# Patient Record
Sex: FEMALE | Race: WHITE | Employment: FULL TIME | URBAN - METROPOLITAN AREA
[De-identification: names, ages, dates, MRNs, and addresses within clinical notes are randomized per-mention and may not be internally consistent; named-entity substitution may affect disease eponyms.]

---

## 2017-06-05 ENCOUNTER — HOSPITAL ENCOUNTER (OUTPATIENT)
Dept: LAB | Age: 47
Discharge: HOME | End: 2017-06-05

## 2017-06-05 PROCEDURE — 88305 TISSUE EXAM BY PATHOLOGIST: CPT | Performed by: INTERNAL MEDICINE

## 2018-02-12 ENCOUNTER — HOSPITAL ENCOUNTER (OUTPATIENT)
Dept: MAMMOGRAPHY | Age: 48
Discharge: HOME | End: 2018-02-12
Attending: FAMILY MEDICINE
Payer: COMMERCIAL

## 2018-02-12 DIAGNOSIS — Z12.31 VISIT FOR SCREENING MAMMOGRAM: ICD-10-CM

## 2018-02-12 PROCEDURE — 77067 SCR MAMMO BI INCL CAD: CPT

## 2018-10-27 ENCOUNTER — HOSPITAL ENCOUNTER (OUTPATIENT)
Dept: MRI IMAGING | Age: 48
Discharge: HOME | End: 2018-10-27
Attending: FAMILY MEDICINE
Payer: COMMERCIAL

## 2018-10-27 DIAGNOSIS — G50.0 TRIGEMINAL NEURALGIA OF RIGHT SIDE OF FACE: ICD-10-CM

## 2018-10-27 PROCEDURE — 74011250636 HC RX REV CODE- 250/636

## 2018-10-27 PROCEDURE — 70553 MRI BRAIN STEM W/O & W/DYE: CPT

## 2018-10-27 PROCEDURE — A9575 INJ GADOTERATE MEGLUMI 0.1ML: HCPCS

## 2018-10-27 RX ORDER — SODIUM CHLORIDE 0.9 % (FLUSH) 0.9 %
10 SYRINGE (ML) INJECTION
Status: COMPLETED | OUTPATIENT
Start: 2018-10-27 | End: 2018-10-27

## 2018-10-27 RX ORDER — GADOTERATE MEGLUMINE 376.9 MG/ML
16 INJECTION INTRAVENOUS
Status: COMPLETED | OUTPATIENT
Start: 2018-10-27 | End: 2018-10-27

## 2018-10-27 RX ADMIN — GADOTERATE MEGLUMINE 16 ML: 376.9 INJECTION INTRAVENOUS at 10:57

## 2018-10-27 RX ADMIN — Medication 10 ML: at 10:57

## 2018-11-07 PROBLEM — G43.009 MIGRAINE WITHOUT AURA AND WITHOUT STATUS MIGRAINOSUS, NOT INTRACTABLE: Status: ACTIVE | Noted: 2018-11-07

## 2018-11-07 PROBLEM — T50.905D MEDICATION SIDE EFFECT, SUBSEQUENT ENCOUNTER: Status: ACTIVE | Noted: 2018-11-07

## 2018-11-07 PROBLEM — H57.02 PHYSIOLOGIC ANISOCORIA: Status: ACTIVE | Noted: 2018-11-07

## 2018-11-07 PROBLEM — G50.0 TRIGEMINAL NEURALGIA OF RIGHT SIDE OF FACE: Status: ACTIVE | Noted: 2018-11-07

## 2019-01-05 ENCOUNTER — HOSPITAL ENCOUNTER (EMERGENCY)
Age: 49
Discharge: HOME | End: 2019-01-05
Attending: EMERGENCY MEDICINE
Payer: COMMERCIAL

## 2019-01-05 VITALS
TEMPERATURE: 98 F | SYSTOLIC BLOOD PRESSURE: 162 MMHG | DIASTOLIC BLOOD PRESSURE: 87 MMHG | RESPIRATION RATE: 16 BRPM | OXYGEN SATURATION: 99 % | WEIGHT: 180 LBS | BODY MASS INDEX: 28.25 KG/M2 | HEIGHT: 67 IN | HEART RATE: 76 BPM

## 2019-01-05 DIAGNOSIS — G50.0 TRIGEMINAL NEURALGIA: Primary | ICD-10-CM

## 2019-01-05 PROCEDURE — 74011000250 HC RX REV CODE- 250: Performed by: NURSE PRACTITIONER

## 2019-01-05 PROCEDURE — 74011250636 HC RX REV CODE- 250/636: Performed by: NURSE PRACTITIONER

## 2019-01-05 PROCEDURE — 99284 EMERGENCY DEPT VISIT MOD MDM: CPT | Performed by: EMERGENCY MEDICINE

## 2019-01-05 PROCEDURE — 96372 THER/PROPH/DIAG INJ SC/IM: CPT | Performed by: EMERGENCY MEDICINE

## 2019-01-05 RX ORDER — ACETAMINOPHEN AND CODEINE PHOSPHATE 300; 30 MG/1; MG/1
1 TABLET ORAL
Qty: 20 TAB | Refills: 0 | Status: SHIPPED | OUTPATIENT
Start: 2019-01-05

## 2019-01-05 RX ORDER — LIDOCAINE HYDROCHLORIDE 20 MG/ML
15 SOLUTION OROPHARYNGEAL AS NEEDED
Qty: 1 BOTTLE | Refills: 0 | Status: SHIPPED | OUTPATIENT
Start: 2019-01-05

## 2019-01-05 RX ORDER — LIDOCAINE HYDROCHLORIDE 20 MG/ML
15 SOLUTION OROPHARYNGEAL
Status: COMPLETED | OUTPATIENT
Start: 2019-01-05 | End: 2019-01-05

## 2019-01-05 RX ORDER — MORPHINE SULFATE 2 MG/ML
2 INJECTION, SOLUTION INTRAMUSCULAR; INTRAVENOUS
Status: COMPLETED | OUTPATIENT
Start: 2019-01-05 | End: 2019-01-05

## 2019-01-05 RX ADMIN — LIDOCAINE HYDROCHLORIDE 15 ML: 20 SOLUTION ORAL; TOPICAL at 15:34

## 2019-01-05 RX ADMIN — MORPHINE SULFATE 2 MG: 2 INJECTION, SOLUTION INTRAMUSCULAR; INTRAVENOUS at 15:30

## 2019-01-05 NOTE — ED NOTES
I have reviewed discharge instructions with the patient. The patient verbalized understanding. Patient left ED via Discharge Method: ambulatory to Home   Opportunity for questions and clarification provided. Patient given 2 scripts. To continue your aftercare when you leave the hospital, you may receive an automated call from our care team to check in on how you are doing. This is a free service and part of our promise to provide the best care and service to meet your aftercare needs.  If you have questions, or wish to unsubscribe from this service please call [phone]. Thank you for Choosing our [facility] Emergency Department.

## 2019-01-05 NOTE — DISCHARGE INSTRUCTIONS
Patient Education   home with family    Take the baclofen twice daily, and restart your neurontin/gabapentin 100 mg four times daily. Continue your trileptal as well. Use the viscous lidocaine on the inside of your cheek every 3 hours as needed as well. Contact Dr Adriana Watkins on Monday for the Pain Team.  Work note     Trigeminal Neuralgia: Care Instructions  Your Care Instructions    Trigeminal neuralgia is a problem with the large nerve that brings feeling to your face. It causes a sudden, sharp pain on one side of your face. Just touching your cheek or talking can set off shooting pain toward the ear, eye, or nostril. Living with this pain can be very hard. Some people have long periods when they do not have pain, and then it comes back. Some people have periods of pain often. But medicine or other treatment often can make the pain go away. If you keep having pain, surgery may help. This problem is also called tic douloureux (say \"tik doo-smith-CARY\"). Follow-up care is a key part of your treatment and safety. Be sure to make and go to all appointments, and call your doctor if you are having problems. It's also a good idea to know your test results and keep a list of the medicines you take. How can you care for yourself at home? · Write down when you have pain and what you were doing when it started. Try to find what causes the pain. Being in a cold wind, yawning, or shaving are examples. Avoid or limit these triggers if you can. · Be safe with medicines. Take your medicines exactly as prescribed. ? Your doctor may have prescribed medicines used to treat depression and seizures. They can reduce your pain, help you sleep better, and improve your mood. ? Call your doctor if you think you are having a problem with your medicine. You will get more details on the specific medicines your doctor prescribes.   ? If you are not taking a prescription pain medicine, take an over-the-counter medicine such as acetaminophen (Tylenol), ibuprofen (Advil, Motrin), or naproxen (Aleve). Read and follow all instructions on the label. ? Do not take two or more pain medicines at the same time unless the doctor told you to. Many pain medicines have acetaminophen, which is Tylenol. Too much acetaminophen (Tylenol) can be harmful. · Reduce stress in your life. Ask your doctor about ways to relax. These may include breathing exercises and massage. · Think about joining a support group with other people who have this problem. These groups can give comfort and information about what to do to feel better. Your doctor can tell you how to find a support group. When should you call for help? Call your doctor now or seek immediate medical care if:    · You have severe pain that you can't control.    Watch closely for changes in your health, and be sure to contact your doctor if:    · You are not able to sleep because of the pain.     · You do not get better as expected. Where can you learn more? Go to http://savanna-tye.info/. Enter R378 in the search box to learn more about \"Trigeminal Neuralgia: Care Instructions. \"  Current as of: November 20, 2017  Content Version: 11.8  © 6638-9261 [employer]. Care instructions adapted under license by [employer] (which disclaims liability or warranty for this information). If you have questions about a medical condition or this instruction, always ask your healthcare professional. [place] [address] any warranty or liability for your use of this information.

## 2019-01-05 NOTE — LETTER
[facility] EMERGENCY DEPT  [place] [id number] [phone]    Work/School Note    Date: 1/5/2019    To Whom It May concern:    Tab Arce was seen and treated today in the emergency room by the following provider(s):  Attending Provider: Divya Puri MD  Nurse Practitioner: Olman Galan NP. Natividad Husamjoanna Crews may return to work on Wednesday.     Sincerely,          Rehana Valera NP

## 2019-01-05 NOTE — ED TRIAGE NOTES
Pt states she was given baclofen from [address] [place] [address] today and has taken one but did not help with the pain at all.

## 2019-01-05 NOTE — ED TRIAGE NOTES
Pt states hx of trigeminal neuralgia and having severe pain on right side of mouth. States medication is not helping the pain this week but much worse last night.

## 2019-01-05 NOTE — ED PROVIDER NOTES
51 y/o f to ed with severe right side facial pain, cheek most severe. Has been diagnosed by MRI with trigeminal neuralgia, has been on trileptal since October with increasing pain in right face since that time. Has seen neurology Dr Romero and he added neurontin 100 mg qid. Over the last few weeks pain has increased dramatically but severely in last week. Primary md has increased trileptal from 600 mg to 900 to 1200 mg bid. Pt was seen at urgent care earlier today and started on baclofen as well but pain severe. She has come to ed for pain releif and asking for neurology to be contacted. She states she cant eat, drink, talk and having severe pain any movement of her mouth or face.    at side              Past Medical History:   Diagnosis Date    Allergic rhinitis 2013    Chronic LBP     and leg pain secondary nerve damage    DDD (degenerative disc disease), lumbar     EVI (generalized anxiety disorder) 2013    GERD (gastroesophageal reflux disease) 2013    HTN (hypertension), benign 2013    Kidney stone 2014    Migraine headache 2013    TABITHA (obstructive sleep apnea) 2008    not using CPAP now       Past Surgical History:   Procedure Laterality Date    HX  SECTION      HX HYSTERECTOMY  1998    bleeding    HX LAP CHOLECYSTECTOMY  10/2014    HX LUMBAR DISKECTOMY      HX LUMBAR DISKECTOMY  2013    L5 - S1    HX OTHER SURGICAL      Urethral dilation         Family History:   Problem Relation Age of Onset    Breast Cancer Mother 36        had 2nd episode-neg BRC    Hypertension Mother    [employer] Cancer Mother     Hypertension Father     Hypertension Brother     Breast Cancer Maternal Grandmother 61    Cancer Maternal Grandmother         Pancreatic    Diabetes Maternal Grandmother     Colon Cancer Maternal Grandfather 61    Stroke Paternal Grandmother     Diabetes Paternal Grandfather        Social History     Socioeconomic History    Marital status:      Spouse name: Eduar Brown Number of children: Not on file    Years of education: Not on file    Highest education level: Not on file   Social Needs    Financial resource strain: Not on file    Food insecurity - worry: Not on file    Food insecurity - inability: Not on file    Transportation needs - medical: Not on file   [employer] needs - non-medical: Not on file   Occupational History    Occupation: Teacher    Tobacco Use    Smoking status: Never Smoker    Smokeless tobacco: Never Used   Substance and Sexual Activity    Alcohol use: No    Drug use: No    Sexual activity: Not on file   Other Topics Concern    Not on file   Social History Narrative    Not on file         ALLERGIES: Flonase [fluticasone]; Keflex [cephalexin]; Penicillins; and Sulfa (sulfonamide antibiotics)    Review of Systems   Constitutional: Negative for chills and fever. HENT: Negative for ear pain and facial swelling. Eyes: Negative for discharge and redness. Respiratory: Negative for cough and shortness of breath. Cardiovascular: Negative for chest pain and palpitations. Gastrointestinal: Negative for nausea and vomiting. Genitourinary: Negative for difficulty urinating and dysuria. Musculoskeletal: Negative for back pain and neck pain. Skin: Negative for color change and wound. Neurological: Positive for headaches. Negative for weakness and light-headedness. Psychiatric/Behavioral: Negative for confusion and decreased concentration. Vitals:    01/05/19 1427   BP: 144/73   Pulse: 92   Resp: 16   Temp: 97.9 °F (36.6 °C)   SpO2: 100%   Weight: 81.6 kg (180 lb)   Height: 5' 7\" (1.702 m)            Physical Exam   Constitutional: She is oriented to person, place, and time. She appears well-developed and well-nourished. She appears distressed (crying with pain). HENT:   Head: Normocephalic and atraumatic. Eyes: EOM are normal. Pupils are equal, round, and reactive to light. Neck: Normal range of motion. Cardiovascular: Normal rate and regular rhythm. Pulmonary/Chest: Effort normal and breath sounds normal.   Musculoskeletal: Normal range of motion. Neurological: She is alert and oriented to person, place, and time. Skin: Skin is warm and dry. Psychiatric: She has a normal mood and affect. Her behavior is normal. Thought content normal.   Nursing note and vitals reviewed. MDM  Number of Diagnoses or Management Options  Diagnosis management comments: 49 y/o f to ed with severe right side facial pain, cheek most severe. Has been diagnosed by MRI with trigeminal neuralgia, has been on trileptal since October with increasing pain in right face since that time. Has seen neurology Dr Romero and he added neurontin 100 mg qid. Over the last few weeks pain has increased dramatically but severely in last week. Primary md has increased trileptal from 600 mg to 900 to 1200 mg bid. Pt was seen at urgent care earlier today and started on baclofen as well but pain severe. She has come to ed for pain releif and asking for neurology to be contacted. She states she cant eat, drink, talk and having severe pain any movement of her mouth or face.  at side   3:51 PM  Will provide im morphine and buccal lidocaine and discuss with neurologist on call.    4:09 PM\  D/w Dr Romero - asked me to confirm if pt still on neurontin and if not restart at same dose, find out dose and frequency of baclofen and continue with neurontin at 10 mg bid. And pt to see pain team with Dr Shemar Sandoval on Monday. I have discussed with pt who is feeling better now and she stopped neurontin some time ago. But she still has med bottle with refills on bottle. As well, baclofen was written for 10 mg tid - but I have discussed to take twice daily.    Will dc home       Amount and/or Complexity of Data Reviewed  Discuss the patient with other providers: yes Serjio Vick - neuro)    Risk of Complications, Morbidity, and/or Mortality  Presenting problems: minimal  Diagnostic procedures: minimal  Management options: low    Patient Progress  Patient progress: stable         Procedures

## 2019-01-12 ENCOUNTER — HOSPITAL ENCOUNTER (INPATIENT)
Age: 49
LOS: 3 days | Discharge: HOME | DRG: 683 | End: 2019-01-15
Attending: EMERGENCY MEDICINE | Admitting: HOSPITALIST
Payer: COMMERCIAL

## 2019-01-12 DIAGNOSIS — N17.9 ACUTE KIDNEY INJURY (HCC): ICD-10-CM

## 2019-01-12 DIAGNOSIS — R19.7 ACUTE DIARRHEA: ICD-10-CM

## 2019-01-12 DIAGNOSIS — I95.9 HYPOTENSION, UNSPECIFIED HYPOTENSION TYPE: ICD-10-CM

## 2019-01-12 DIAGNOSIS — R19.7 DIARRHEA, UNSPECIFIED TYPE: ICD-10-CM

## 2019-01-12 DIAGNOSIS — A41.9 SEVERE SEPSIS (HCC): ICD-10-CM

## 2019-01-12 DIAGNOSIS — R65.20 SEVERE SEPSIS (HCC): ICD-10-CM

## 2019-01-12 DIAGNOSIS — E87.1 ACUTE HYPONATREMIA: Primary | ICD-10-CM

## 2019-01-12 PROBLEM — N39.0 ACUTE UTI (URINARY TRACT INFECTION): Status: ACTIVE | Noted: 2019-01-12

## 2019-01-12 PROBLEM — D72.829 LEUKOCYTOSIS: Status: ACTIVE | Noted: 2019-01-12

## 2019-01-12 PROBLEM — E87.20 LACTIC ACIDOSIS: Status: ACTIVE | Noted: 2019-01-12

## 2019-01-12 LAB
ALBUMIN SERPL-MCNC: 3.9 G/DL (ref 3.5–5)
ALBUMIN/GLOB SERPL: 1 {RATIO}
ALP SERPL-CCNC: 200 U/L (ref 50–130)
ALT SERPL-CCNC: 93 U/L (ref 12–65)
ANION GAP SERPL CALC-SCNC: 13 MMOL/L
APPEARANCE UR: ABNORMAL
AST SERPL-CCNC: 73 U/L (ref 15–37)
BACTERIA URNS QL MICRO: ABNORMAL /HPF
BASOPHILS # BLD: 0.1 K/UL (ref 0–0.2)
BASOPHILS NFR BLD: 0 % (ref 0–2)
BILIRUB SERPL-MCNC: 0.6 MG/DL (ref 0.2–1.1)
BILIRUB UR QL: ABNORMAL
BUN SERPL-MCNC: 17 MG/DL (ref 6–23)
CALCIUM SERPL-MCNC: 9.2 MG/DL (ref 8.3–10.4)
CASTS URNS QL MICRO: ABNORMAL /LPF
CHLORIDE SERPL-SCNC: 76 MMOL/L (ref 98–107)
CO2 SERPL-SCNC: 24 MMOL/L (ref 21–32)
COLOR UR: ABNORMAL
CREAT SERPL-MCNC: 1.96 MG/DL (ref 0.6–1)
DIFFERENTIAL METHOD BLD: ABNORMAL
EOSINOPHIL # BLD: 0 K/UL (ref 0–0.8)
EOSINOPHIL NFR BLD: 0 % (ref 0.5–7.8)
EPI CELLS #/AREA URNS HPF: ABNORMAL /HPF
ERYTHROCYTE [DISTWIDTH] IN BLOOD BY AUTOMATED COUNT: 12.3 % (ref 11.9–14.6)
GLOBULIN SER CALC-MCNC: 4.1 G/DL (ref 2.3–3.5)
GLUCOSE SERPL-MCNC: 186 MG/DL (ref 65–100)
GLUCOSE UR STRIP.AUTO-MCNC: NEGATIVE MG/DL
HCT VFR BLD AUTO: 44.3 % (ref 35.8–46.3)
HGB BLD-MCNC: 15.7 G/DL (ref 11.7–15.4)
HGB UR QL STRIP: NEGATIVE
IMM GRANULOCYTES # BLD AUTO: 0.1 K/UL (ref 0–0.5)
IMM GRANULOCYTES NFR BLD AUTO: 0 % (ref 0–5)
KETONES UR QL STRIP.AUTO: ABNORMAL MG/DL
LACTATE BLD-SCNC: 4.07 MMOL/L (ref 0.5–1.9)
LACTATE BLD-SCNC: 5.63 MMOL/L (ref 0.5–1.9)
LEUKOCYTE ESTERASE UR QL STRIP.AUTO: ABNORMAL
LYMPHOCYTES # BLD: 0.7 K/UL (ref 0.5–4.6)
LYMPHOCYTES NFR BLD: 4 % (ref 13–44)
MCH RBC QN AUTO: 29.5 PG (ref 26.1–32.9)
MCHC RBC AUTO-ENTMCNC: 35.4 G/DL (ref 31.4–35)
MCV RBC AUTO: 83.3 FL (ref 79.6–97.8)
MONOCYTES # BLD: 0.7 K/UL (ref 0.1–1.3)
MONOCYTES NFR BLD: 4 % (ref 4–12)
NEUTS SEG # BLD: 14.9 K/UL (ref 1.7–8.2)
NEUTS SEG NFR BLD: 91 % (ref 43–78)
NITRITE UR QL STRIP.AUTO: NEGATIVE
NRBC # BLD: 0 K/UL (ref 0–0.2)
PH UR STRIP: 5.5 [PH] (ref 5–9)
PLATELET # BLD AUTO: 415 K/UL (ref 150–450)
PMV BLD AUTO: 8.8 FL (ref 9.4–12.3)
POTASSIUM SERPL-SCNC: 4.3 MMOL/L (ref 3.5–5.1)
PROCALCITONIN SERPL-MCNC: 3.8 NG/ML
PROT SERPL-MCNC: 8 G/DL
PROT UR STRIP-MCNC: 30 MG/DL
RBC # BLD AUTO: 5.32 M/UL (ref 4.05–5.2)
SODIUM SERPL-SCNC: 113 MMOL/L (ref 136–145)
SP GR UR REFRACTOMETRY: 1.02 (ref 1–1.02)
UROBILINOGEN UR QL STRIP.AUTO: 1 EU/DL (ref 0.2–1)
WBC # BLD AUTO: 16.4 K/UL (ref 4.3–11.1)
WBC URNS QL MICRO: ABNORMAL /HPF

## 2019-01-12 PROCEDURE — 85025 COMPLETE CBC W/AUTO DIFF WBC: CPT

## 2019-01-12 PROCEDURE — 74011250636 HC RX REV CODE- 250/636: Performed by: HOSPITALIST

## 2019-01-12 PROCEDURE — 83605 ASSAY OF LACTIC ACID: CPT

## 2019-01-12 PROCEDURE — 96365 THER/PROPH/DIAG IV INF INIT: CPT | Performed by: EMERGENCY MEDICINE

## 2019-01-12 PROCEDURE — 99285 EMERGENCY DEPT VISIT HI MDM: CPT | Performed by: EMERGENCY MEDICINE

## 2019-01-12 PROCEDURE — 80053 COMPREHEN METABOLIC PANEL: CPT

## 2019-01-12 PROCEDURE — 65660000000 HC RM CCU STEPDOWN

## 2019-01-12 PROCEDURE — 74011250637 HC RX REV CODE- 250/637: Performed by: HOSPITALIST

## 2019-01-12 PROCEDURE — 87086 URINE CULTURE/COLONY COUNT: CPT

## 2019-01-12 PROCEDURE — 87040 BLOOD CULTURE FOR BACTERIA: CPT

## 2019-01-12 PROCEDURE — 81001 URINALYSIS AUTO W/SCOPE: CPT

## 2019-01-12 PROCEDURE — 96367 TX/PROPH/DG ADDL SEQ IV INF: CPT | Performed by: EMERGENCY MEDICINE

## 2019-01-12 PROCEDURE — 84145 PROCALCITONIN (PCT): CPT

## 2019-01-12 PROCEDURE — 80183 DRUG SCRN QUANT OXCARBAZEPIN: CPT

## 2019-01-12 PROCEDURE — 93005 ELECTROCARDIOGRAM TRACING: CPT | Performed by: EMERGENCY MEDICINE

## 2019-01-12 PROCEDURE — 77030020263 HC SOL INJ SOD CL0.9% LFCR 1000ML

## 2019-01-12 PROCEDURE — 74011000258 HC RX REV CODE- 258: Performed by: EMERGENCY MEDICINE

## 2019-01-12 PROCEDURE — 77030032490 HC SLV COMPR SCD KNE COVD -B

## 2019-01-12 PROCEDURE — 74011250636 HC RX REV CODE- 250/636: Performed by: EMERGENCY MEDICINE

## 2019-01-12 RX ORDER — LIDOCAINE HYDROCHLORIDE 20 MG/ML
15 SOLUTION OROPHARYNGEAL AS NEEDED
Status: DISCONTINUED | OUTPATIENT
Start: 2019-01-12 | End: 2019-01-15 | Stop reason: HOSPADM

## 2019-01-12 RX ORDER — OXCARBAZEPINE 300 MG/1
1200 TABLET, FILM COATED ORAL EVERY 12 HOURS
Status: DISCONTINUED | OUTPATIENT
Start: 2019-01-13 | End: 2019-01-13 | Stop reason: SDUPTHER

## 2019-01-12 RX ORDER — BETAMETHASONE VALERATE 1.2 MG/G
CREAM TOPICAL
Status: DISCONTINUED | OUTPATIENT
Start: 2019-01-12 | End: 2019-01-15 | Stop reason: HOSPADM

## 2019-01-12 RX ORDER — CIPROFLOXACIN 2 MG/ML
400 INJECTION, SOLUTION INTRAVENOUS EVERY 12 HOURS
Status: DISCONTINUED | OUTPATIENT
Start: 2019-01-12 | End: 2019-01-15 | Stop reason: HOSPADM

## 2019-01-12 RX ORDER — GABAPENTIN 100 MG/1
100 CAPSULE ORAL 4 TIMES DAILY
Status: DISCONTINUED | OUTPATIENT
Start: 2019-01-12 | End: 2019-01-15 | Stop reason: HOSPADM

## 2019-01-12 RX ORDER — OXCARBAZEPINE 300 MG/1
1200 TABLET, FILM COATED ORAL EVERY 12 HOURS
Status: DISCONTINUED | OUTPATIENT
Start: 2019-01-12 | End: 2019-01-12

## 2019-01-12 RX ORDER — ACETAMINOPHEN AND CODEINE PHOSPHATE 300; 30 MG/1; MG/1
1 TABLET ORAL
Status: DISCONTINUED | OUTPATIENT
Start: 2019-01-12 | End: 2019-01-12

## 2019-01-12 RX ORDER — GABAPENTIN 100 MG/1
100 CAPSULE ORAL 4 TIMES DAILY
COMMUNITY
End: 2022-02-11

## 2019-01-12 RX ORDER — PANTOPRAZOLE SODIUM 40 MG/1
40 TABLET, DELAYED RELEASE ORAL
Status: DISCONTINUED | OUTPATIENT
Start: 2019-01-13 | End: 2019-01-15 | Stop reason: HOSPADM

## 2019-01-12 RX ORDER — DIPHENOXYLATE HYDROCHLORIDE AND ATROPINE SULFATE 2.5; .025 MG/1; MG/1
2 TABLET ORAL
Status: DISCONTINUED | OUTPATIENT
Start: 2019-01-12 | End: 2019-01-15 | Stop reason: HOSPADM

## 2019-01-12 RX ORDER — NALOXONE HYDROCHLORIDE 0.4 MG/ML
0.4 INJECTION, SOLUTION INTRAMUSCULAR; INTRAVENOUS; SUBCUTANEOUS AS NEEDED
Status: DISCONTINUED | OUTPATIENT
Start: 2019-01-12 | End: 2019-01-15 | Stop reason: HOSPADM

## 2019-01-12 RX ORDER — ENOXAPARIN SODIUM 100 MG/ML
40 INJECTION SUBCUTANEOUS EVERY 24 HOURS
Status: DISCONTINUED | OUTPATIENT
Start: 2019-01-12 | End: 2019-01-12

## 2019-01-12 RX ORDER — SODIUM CHLORIDE 9 MG/ML
100 INJECTION, SOLUTION INTRAVENOUS CONTINUOUS
Status: DISCONTINUED | OUTPATIENT
Start: 2019-01-12 | End: 2019-01-15 | Stop reason: HOSPADM

## 2019-01-12 RX ORDER — SODIUM CHLORIDE 0.9 % (FLUSH) 0.9 %
5-40 SYRINGE (ML) INJECTION AS NEEDED
Status: DISCONTINUED | OUTPATIENT
Start: 2019-01-12 | End: 2019-01-15 | Stop reason: HOSPADM

## 2019-01-12 RX ORDER — OXCARBAZEPINE 300 MG/1
1200 TABLET, FILM COATED ORAL EVERY 12 HOURS
Status: DISCONTINUED | OUTPATIENT
Start: 2019-01-13 | End: 2019-01-12

## 2019-01-12 RX ORDER — METRONIDAZOLE 500 MG/100ML
500 INJECTION, SOLUTION INTRAVENOUS EVERY 8 HOURS
Status: DISCONTINUED | OUTPATIENT
Start: 2019-01-13 | End: 2019-01-15 | Stop reason: HOSPADM

## 2019-01-12 RX ORDER — SAME BUTANEDISULFONATE/BETAINE 400-600 MG
250 POWDER IN PACKET (EA) ORAL 2 TIMES DAILY
Status: DISCONTINUED | OUTPATIENT
Start: 2019-01-13 | End: 2019-01-15 | Stop reason: HOSPADM

## 2019-01-12 RX ORDER — CLINDAMYCIN PHOSPHATE 900 MG/50ML
900 INJECTION INTRAVENOUS
Status: DISCONTINUED | OUTPATIENT
Start: 2019-01-12 | End: 2019-01-12

## 2019-01-12 RX ORDER — VANCOMYCIN/0.9 % SOD CHLORIDE 1.5G/250ML
1500 PLASTIC BAG, INJECTION (ML) INTRAVENOUS EVERY 24 HOURS
Status: DISCONTINUED | OUTPATIENT
Start: 2019-01-13 | End: 2019-01-13

## 2019-01-12 RX ORDER — ONDANSETRON 2 MG/ML
4 INJECTION INTRAMUSCULAR; INTRAVENOUS
Status: DISCONTINUED | OUTPATIENT
Start: 2019-01-12 | End: 2019-01-15 | Stop reason: HOSPADM

## 2019-01-12 RX ORDER — DICYCLOMINE HYDROCHLORIDE 20 MG/1
20 TABLET ORAL
Status: DISCONTINUED | OUTPATIENT
Start: 2019-01-12 | End: 2019-01-15 | Stop reason: HOSPADM

## 2019-01-12 RX ORDER — SODIUM CHLORIDE 0.9 % (FLUSH) 0.9 %
5-40 SYRINGE (ML) INJECTION EVERY 8 HOURS
Status: DISCONTINUED | OUTPATIENT
Start: 2019-01-12 | End: 2019-01-15 | Stop reason: HOSPADM

## 2019-01-12 RX ADMIN — VANCOMYCIN HYDROCHLORIDE 1000 MG: 1 INJECTION, POWDER, LYOPHILIZED, FOR SOLUTION INTRAVENOUS at 21:16

## 2019-01-12 RX ADMIN — AZTREONAM 2 G: 2 INJECTION, POWDER, LYOPHILIZED, FOR SOLUTION INTRAMUSCULAR; INTRAVENOUS at 20:30

## 2019-01-12 RX ADMIN — METRONIDAZOLE 500 MG: 500 INJECTION, SOLUTION INTRAVENOUS at 23:34

## 2019-01-12 RX ADMIN — SODIUM CHLORIDE 1000 ML: 900 INJECTION, SOLUTION INTRAVENOUS at 19:34

## 2019-01-12 RX ADMIN — OXCARBAZEPINE 1200 MG: 300 TABLET, FILM COATED ORAL at 23:34

## 2019-01-12 RX ADMIN — Medication 10 ML: at 23:35

## 2019-01-12 RX ADMIN — SODIUM CHLORIDE 1000 ML: 900 INJECTION, SOLUTION INTRAVENOUS at 21:34

## 2019-01-12 RX ADMIN — CIPROFLOXACIN 400 MG: 2 INJECTION, SOLUTION INTRAVENOUS at 23:34

## 2019-01-12 RX ADMIN — SODIUM CHLORIDE 1000 ML: 900 INJECTION, SOLUTION INTRAVENOUS at 22:54

## 2019-01-12 RX ADMIN — GABAPENTIN 100 MG: 100 CAPSULE ORAL at 23:31

## 2019-01-13 LAB
ALBUMIN SERPL-MCNC: 2.7 G/DL (ref 3.5–5)
ALBUMIN/GLOB SERPL: 0.9 {RATIO}
ALP SERPL-CCNC: 130 U/L (ref 50–136)
ALT SERPL-CCNC: 92 U/L (ref 12–65)
ANION GAP SERPL CALC-SCNC: 10 MMOL/L
ANION GAP SERPL CALC-SCNC: 12 MMOL/L
ANION GAP SERPL CALC-SCNC: 7 MMOL/L
ANION GAP SERPL CALC-SCNC: 7 MMOL/L
AST SERPL-CCNC: 54 U/L (ref 15–37)
ATRIAL RATE: 69 BPM
BASOPHILS # BLD: 0 K/UL (ref 0–0.2)
BASOPHILS NFR BLD: 0 % (ref 0–2)
BILIRUB SERPL-MCNC: 0.3 MG/DL (ref 0.2–1.1)
BUN SERPL-MCNC: 10 MG/DL (ref 6–23)
BUN SERPL-MCNC: 12 MG/DL (ref 6–23)
BUN SERPL-MCNC: 7 MG/DL (ref 6–23)
BUN SERPL-MCNC: 8 MG/DL (ref 6–23)
CALCIUM SERPL-MCNC: 7.7 MG/DL (ref 8.3–10.4)
CALCIUM SERPL-MCNC: 7.8 MG/DL (ref 8.3–10.4)
CALCULATED P AXIS, ECG09: 32 DEGREES
CALCULATED R AXIS, ECG10: 72 DEGREES
CALCULATED T AXIS, ECG11: 72 DEGREES
CHLORIDE SERPL-SCNC: 101 MMOL/L (ref 98–107)
CHLORIDE SERPL-SCNC: 103 MMOL/L (ref 98–107)
CHLORIDE SERPL-SCNC: 93 MMOL/L (ref 98–107)
CHLORIDE SERPL-SCNC: 99 MMOL/L (ref 98–107)
CO2 SERPL-SCNC: 20 MMOL/L (ref 21–32)
CO2 SERPL-SCNC: 23 MMOL/L (ref 21–32)
CO2 SERPL-SCNC: 24 MMOL/L (ref 21–32)
CO2 SERPL-SCNC: 25 MMOL/L (ref 21–32)
CREAT SERPL-MCNC: 0.79 MG/DL (ref 0.6–1)
CREAT SERPL-MCNC: 0.84 MG/DL (ref 0.6–1)
CREAT SERPL-MCNC: 0.95 MG/DL (ref 0.6–1)
CREAT SERPL-MCNC: 1.02 MG/DL (ref 0.6–1)
CRP SERPL-MCNC: 6.5 MG/DL (ref 0–0.9)
DIAGNOSIS, 93000: NORMAL
DIFFERENTIAL METHOD BLD: ABNORMAL
EOSINOPHIL # BLD: 0 K/UL (ref 0–0.8)
EOSINOPHIL NFR BLD: 0 % (ref 0.5–7.8)
ERYTHROCYTE [DISTWIDTH] IN BLOOD BY AUTOMATED COUNT: 12.5 % (ref 11.9–14.6)
GLOBULIN SER CALC-MCNC: 3 G/DL (ref 2.3–3.5)
GLUCOSE SERPL-MCNC: 101 MG/DL (ref 65–100)
GLUCOSE SERPL-MCNC: 109 MG/DL (ref 65–100)
GLUCOSE SERPL-MCNC: 122 MG/DL (ref 65–100)
GLUCOSE SERPL-MCNC: 137 MG/DL (ref 65–100)
HCT VFR BLD AUTO: 36.4 % (ref 35.8–46.3)
HGB BLD-MCNC: 12.5 G/DL (ref 11.7–15.4)
IMM GRANULOCYTES # BLD AUTO: 0 K/UL (ref 0–0.5)
IMM GRANULOCYTES NFR BLD AUTO: 0 % (ref 0–5)
LACTATE SERPL-SCNC: 1.1 MMOL/L (ref 0.4–2)
LYMPHOCYTES # BLD: 0.9 K/UL (ref 0.5–4.6)
LYMPHOCYTES NFR BLD: 11 % (ref 13–44)
MAGNESIUM SERPL-MCNC: 2 MG/DL (ref 1.8–2.4)
MCH RBC QN AUTO: 29.1 PG (ref 26.1–32.9)
MCHC RBC AUTO-ENTMCNC: 34.3 G/DL (ref 31.4–35)
MCV RBC AUTO: 84.7 FL (ref 79.6–97.8)
MONOCYTES # BLD: 0.5 K/UL (ref 0.1–1.3)
MONOCYTES NFR BLD: 6 % (ref 4–12)
NEUTS SEG # BLD: 6.7 K/UL (ref 1.7–8.2)
NEUTS SEG NFR BLD: 82 % (ref 43–78)
NRBC # BLD: 0 K/UL (ref 0–0.2)
P-R INTERVAL, ECG05: 140 MS
PHOSPHATE SERPL-MCNC: 3.3 MG/DL (ref 2.5–4.5)
PLATELET # BLD AUTO: 303 K/UL (ref 150–450)
PMV BLD AUTO: 8.2 FL (ref 9.4–12.3)
POTASSIUM SERPL-SCNC: 3.3 MMOL/L (ref 3.5–5.1)
POTASSIUM SERPL-SCNC: 3.9 MMOL/L (ref 3.5–5.1)
POTASSIUM SERPL-SCNC: 3.9 MMOL/L (ref 3.5–5.1)
POTASSIUM SERPL-SCNC: 4.7 MMOL/L (ref 3.5–5.1)
PROT SERPL-MCNC: 5.7 G/DL
Q-T INTERVAL, ECG07: 438 MS
QRS DURATION, ECG06: 84 MS
QTC CALCULATION (BEZET), ECG08: 469 MS
RBC # BLD AUTO: 4.3 M/UL (ref 4.05–5.2)
SODIUM SERPL-SCNC: 126 MMOL/L (ref 136–145)
SODIUM SERPL-SCNC: 131 MMOL/L (ref 136–145)
SODIUM SERPL-SCNC: 133 MMOL/L (ref 136–145)
SODIUM SERPL-SCNC: 134 MMOL/L (ref 136–145)
TSH SERPL DL<=0.005 MIU/L-ACNC: 0.36 UIU/ML
VENTRICULAR RATE, ECG03: 69 BPM
WBC # BLD AUTO: 8.1 K/UL (ref 4.3–11.1)

## 2019-01-13 PROCEDURE — 74011250637 HC RX REV CODE- 250/637: Performed by: HOSPITALIST

## 2019-01-13 PROCEDURE — 74011250636 HC RX REV CODE- 250/636: Performed by: EMERGENCY MEDICINE

## 2019-01-13 PROCEDURE — 83605 ASSAY OF LACTIC ACID: CPT

## 2019-01-13 PROCEDURE — 89055 LEUKOCYTE ASSESSMENT FECAL: CPT

## 2019-01-13 PROCEDURE — 77030020263 HC SOL INJ SOD CL0.9% LFCR 1000ML

## 2019-01-13 PROCEDURE — 86140 C-REACTIVE PROTEIN: CPT

## 2019-01-13 PROCEDURE — 65660000000 HC RM CCU STEPDOWN

## 2019-01-13 PROCEDURE — 74011250636 HC RX REV CODE- 250/636: Performed by: HOSPITALIST

## 2019-01-13 PROCEDURE — 84100 ASSAY OF PHOSPHORUS: CPT

## 2019-01-13 PROCEDURE — 74011000258 HC RX REV CODE- 258: Performed by: EMERGENCY MEDICINE

## 2019-01-13 PROCEDURE — 36415 COLL VENOUS BLD VENIPUNCTURE: CPT

## 2019-01-13 PROCEDURE — 84443 ASSAY THYROID STIM HORMONE: CPT

## 2019-01-13 PROCEDURE — 80048 BASIC METABOLIC PNL TOTAL CA: CPT

## 2019-01-13 PROCEDURE — 87045 FECES CULTURE AEROBIC BACT: CPT

## 2019-01-13 PROCEDURE — 87449 NOS EACH ORGANISM AG IA: CPT

## 2019-01-13 PROCEDURE — 83735 ASSAY OF MAGNESIUM: CPT

## 2019-01-13 PROCEDURE — 80053 COMPREHEN METABOLIC PANEL: CPT

## 2019-01-13 PROCEDURE — 87427 SHIGA-LIKE TOXIN AG IA: CPT

## 2019-01-13 PROCEDURE — 85025 COMPLETE CBC W/AUTO DIFF WBC: CPT

## 2019-01-13 RX ORDER — OXCARBAZEPINE 600 MG/1
1200 TABLET, FILM COATED ORAL EVERY 12 HOURS
Status: DISCONTINUED | OUTPATIENT
Start: 2019-01-13 | End: 2019-01-15 | Stop reason: HOSPADM

## 2019-01-13 RX ORDER — POTASSIUM CHLORIDE 20 MEQ/1
40 TABLET, EXTENDED RELEASE ORAL 2 TIMES DAILY
Status: DISCONTINUED | OUTPATIENT
Start: 2019-01-13 | End: 2019-01-14

## 2019-01-13 RX ORDER — POTASSIUM CHLORIDE 14.9 MG/ML
20 INJECTION INTRAVENOUS
Status: COMPLETED | OUTPATIENT
Start: 2019-01-13 | End: 2019-01-13

## 2019-01-13 RX ORDER — OXCARBAZEPINE 150 MG/1
1200 TABLET, FILM COATED ORAL EVERY 12 HOURS
Status: DISCONTINUED | OUTPATIENT
Start: 2019-01-13 | End: 2019-01-13 | Stop reason: SDUPTHER

## 2019-01-13 RX ADMIN — SODIUM CHLORIDE 150 ML/HR: 900 INJECTION, SOLUTION INTRAVENOUS at 01:32

## 2019-01-13 RX ADMIN — POTASSIUM CHLORIDE 40 MEQ: 20 TABLET, EXTENDED RELEASE ORAL at 12:00

## 2019-01-13 RX ADMIN — METRONIDAZOLE 500 MG: 500 INJECTION, SOLUTION INTRAVENOUS at 05:51

## 2019-01-13 RX ADMIN — SODIUM CHLORIDE 150 ML/HR: 900 INJECTION, SOLUTION INTRAVENOUS at 08:46

## 2019-01-13 RX ADMIN — Medication 5 ML: at 06:02

## 2019-01-13 RX ADMIN — METRONIDAZOLE 500 MG: 500 INJECTION, SOLUTION INTRAVENOUS at 14:20

## 2019-01-13 RX ADMIN — GABAPENTIN 100 MG: 100 CAPSULE ORAL at 22:48

## 2019-01-13 RX ADMIN — POTASSIUM CHLORIDE 20 MEQ: 200 INJECTION, SOLUTION INTRAVENOUS at 15:43

## 2019-01-13 RX ADMIN — Medication 250 MG: at 18:13

## 2019-01-13 RX ADMIN — SODIUM CHLORIDE 150 ML/HR: 900 INJECTION, SOLUTION INTRAVENOUS at 18:13

## 2019-01-13 RX ADMIN — PANTOPRAZOLE SODIUM 40 MG: 40 TABLET, DELAYED RELEASE ORAL at 08:39

## 2019-01-13 RX ADMIN — METRONIDAZOLE 500 MG: 500 INJECTION, SOLUTION INTRAVENOUS at 22:48

## 2019-01-13 RX ADMIN — GABAPENTIN 100 MG: 100 CAPSULE ORAL at 18:13

## 2019-01-13 RX ADMIN — OXCARBAZEPINE 1200 MG: 600 TABLET, FILM COATED ORAL at 22:50

## 2019-01-13 RX ADMIN — GABAPENTIN 100 MG: 100 CAPSULE ORAL at 13:11

## 2019-01-13 RX ADMIN — CIPROFLOXACIN 400 MG: 2 INJECTION, SOLUTION INTRAVENOUS at 08:39

## 2019-01-13 RX ADMIN — GABAPENTIN 100 MG: 100 CAPSULE ORAL at 08:39

## 2019-01-13 RX ADMIN — AZTREONAM 2 G: 2 INJECTION, POWDER, LYOPHILIZED, FOR SOLUTION INTRAMUSCULAR; INTRAVENOUS at 04:48

## 2019-01-13 RX ADMIN — OXCARBAZEPINE 1200 MG: 600 TABLET, FILM COATED ORAL at 11:11

## 2019-01-13 RX ADMIN — POTASSIUM CHLORIDE 40 MEQ: 20 TABLET, EXTENDED RELEASE ORAL at 18:13

## 2019-01-13 RX ADMIN — Medication 250 MG: at 08:39

## 2019-01-13 RX ADMIN — POTASSIUM CHLORIDE 20 MEQ: 200 INJECTION, SOLUTION INTRAVENOUS at 12:00

## 2019-01-13 RX ADMIN — CIPROFLOXACIN 400 MG: 2 INJECTION, SOLUTION INTRAVENOUS at 21:06

## 2019-01-13 NOTE — H&P
Hospitalist H&P/Consult Note     Admit Date:  2019  7:34 PM   Name:  Gemma Barker   Age:  50 y.o.  :  1970   MRN:  [id number]   PCP:  Liliana Esteban MD  Treatment Team: Attending Provider: Millie Lundborg, MD    HPI:   Patient is a 51 y/o female with hx HTN, GERD, trigeminal neuralgia, kidney stone  Who presents to ED today with near syncope x 3 and 1 day acute onset of profuse diarrhea. States has had at least 5-6 high volume diarrhea today and her  watched her collapse coming out of restroom. She hit her face and has bruises. No seizures. Denies abdominal pain. Has noticed some hematuria but no flank pain. Has had difficulty with her right facial trigeminal neuralgia over the past 2 weeks and has been on a course of prednisone as well as increased dose of her trileptal. Initially hypotensive 80/46 which improved with fluids. Workup in ED shows her to have a lactic acid of 4.07, WBC ct of 16.4 with 91% neutrophils, sodium of 113 (last reported 140 in November), and an increased creatinine of 1.96. She was bolused fluids, cultures and started on antibiotics. LFTs elevated and these are followed by PCP. She is s/p cholecystectomy. UA positive with trace leukocyte esterase, 2+ bacteria. Hospitalist consulted for admission and further workup and treatment. 10 systems reviewed and negative except as noted in HPI.  No neck pain, confusion  Past Medical History:   Diagnosis Date    Allergic rhinitis 2013    Chronic LBP     and leg pain secondary nerve damage    DDD (degenerative disc disease), lumbar     EVI (generalized anxiety disorder) 2013    GERD (gastroesophageal reflux disease) 2013    HTN (hypertension), benign 2013    Kidney stone 2014    Migraine headache 2013    TABITHA (obstructive sleep apnea) 2008    not using CPAP now      Past Surgical History:   Procedure Laterality Date    HX  SECTION      HX HYSTERECTOMY      bleeding  HX LAP CHOLECYSTECTOMY  10/2014    HX LUMBAR DISKECTOMY  2001    HX LUMBAR DISKECTOMY  11/2013    L5 - S1    HX OTHER SURGICAL      Urethral dilation      Prior to Admission Medications   Prescriptions Last Dose Informant Patient Reported? Taking? OXcarbaxepine (TRILEPTAL) 600 mg tablet 1/12/2019 at Unknown time  No Yes   Sig: Take 2 Tabs by mouth two (2) times a day. acetaminophen-codeine (TYLENOL-CODEINE #3) 300-30 mg per tablet 1/11/2019 at Unknown time  No Yes   Sig: Take 1 Tab by mouth every four (4) hours as needed for Pain. Max Daily Amount: 6 Tabs. betamethasone valerate (VALISONE) 0.1 % topical cream 1/12/2019 at Unknown time  No Yes   Sig: Apply  to affected area two (2) times daily as needed for Skin Irritation. Do not use for more than 2 weeks; avoid face   escitalopram oxalate (LEXAPRO) 10 mg tablet 1/11/2019 at Unknown time  No Yes   Sig: TAKE 1 TAB BY MOUTH NIGHTLY.   gabapentin (NEURONTIN) 100 mg capsule 1/12/2019 at Unknown time  Yes Yes   Sig: Take 100 mg by mouth four (4) times daily. hydroCHLOROthiazide (HYDRODIURIL) 25 mg tablet 1/12/2019 at Unknown time  No Yes   Sig: Take 1 Tab by mouth daily. lidocaine (LIDOCAINE VISCOUS) 2 % solution   No No   Sig: Take 15 mL by mouth as needed for Pain (apply to inside of cheek every 3 hours as needed forpain). lisinopril (PRINIVIL, ZESTRIL) 10 mg tablet 1/12/2019 at Unknown time  No Yes   Sig: Take 1 Tab by mouth daily. omeprazole (PRILOSEC) 40 mg capsule 1/12/2019 at Unknown time  No Yes   Sig: Take 1 Cap by mouth two (2) times a day.  30 mins prior to breakfast and dinner      Facility-Administered Medications: None     Allergies   Allergen Reactions    Flonase [Fluticasone] Other (comments)     Headaches and epistaxis     Keflex [Cephalexin] Hives    Penicillins Hives    Sulfa (Sulfonamide Antibiotics) Hives      Social History     Tobacco Use    Smoking status: Never Smoker    Smokeless tobacco: Never Used   Substance Use Topics    Alcohol use: No      Family History   Problem Relation Age of Onset   Mick Breast Cancer Mother 36        had 2nd episode-neg BRC    Hypertension Mother     Cancer Mother     Hypertension Father     Hypertension Brother     Breast Cancer Maternal Grandmother 61    Cancer Maternal Grandmother         Pancreatic    Diabetes Maternal Grandmother     Colon Cancer Maternal Grandfather 61    Stroke Paternal Grandmother     Diabetes Paternal Grandfather       Immunization History   Administered Date(s) Administered    Tdap 06/21/2012       Objective:     Patient Vitals for the past 24 hrs:   Temp Pulse Resp BP SpO2   01/12/19 2243 98.9 °F (37.2 °C) 97 20 113/78 98 %   01/12/19 2201 98 °F (36.7 °C) 96 18 118/62 98 %   01/12/19 2116  73 14 122/81 98 %   01/12/19 2031  67 12 108/68 97 %   01/12/19 2016  71 15 101/62 97 %   01/12/19 1956  67 14 92/57 98 %   01/12/19 1917 98 °F (36.7 °C) 76 18 (!) 80/46 94 %     Oxygen Therapy  O2 Sat (%): 98 % (01/12/19 2243)  O2 Device: Room air (01/12/19 1917)  No intake or output data in the 24 hours ending 01/12/19 2310    Physical Exam:  General:    Well nourished. Alert. Pale. Bruises on face   Eyes:   Normal sclera. Extraocular movements intact. ENT:  Normocephalic, atraumatic.dry mucous membranes  CV:   RRR. No murmur, rub, or gallop. Lungs:  CTAB. No wheezing, rhonchi, or rales. Abdomen: Soft, nontender, nondistended. Bowel sounds normal.   Extremities: Warm and dry. No cyanosis or edema. Neurologic: CN II-XII grossly intact. Sensation intact. Skin:     No rashes or jaundice. No wounds. Psych:  Normal mood and flat affect. I reviewed the labs, imaging, EKGs, telemetry, and other studies done this admission.   Data Review:   Recent Results (from the past 24 hour(s))   CBC WITH AUTOMATED DIFF    Collection Time: 01/12/19  7:39 PM   Result Value Ref Range    WBC 16.4 (H) 4.3 - 11.1 K/uL    RBC 5.32 (H) 4.05 - 5.2 M/uL    HGB 15.7 (H) 11.7 - 15.4 g/dL    HCT 44.3 35.8 - 46.3 %    MCV 83.3 79.6 - 97.8 FL    MCH 29.5 26.1 - 32.9 PG    MCHC 35.4 (H) 31.4 - 35.0 g/dL    RDW 12.3 11.9 - 14.6 %    PLATELET 974 642 - 226 K/uL    MPV 8.8 (L) 9.4 - 12.3 FL    ABSOLUTE NRBC 0.00 0.0 - 0.2 K/uL    DF AUTOMATED      NEUTROPHILS 91 (H) 43 - 78 %    LYMPHOCYTES 4 (L) 13 - 44 %    MONOCYTES 4 4.0 - 12.0 %    EOSINOPHILS 0 (L) 0.5 - 7.8 %    BASOPHILS 0 0.0 - 2.0 %    IMMATURE GRANULOCYTES 0 0.0 - 5.0 %    ABS. NEUTROPHILS 14.9 (H) 1.7 - 8.2 K/UL    ABS. LYMPHOCYTES 0.7 0.5 - 4.6 K/UL    ABS. MONOCYTES 0.7 0.1 - 1.3 K/UL    ABS. EOSINOPHILS 0.0 0.0 - 0.8 K/UL    ABS. BASOPHILS 0.1 0.0 - 0.2 K/UL    ABS. IMM. GRANS. 0.1 0.0 - 0.5 K/UL   METABOLIC PANEL, COMPREHENSIVE    Collection Time: 01/12/19  7:39 PM   Result Value Ref Range    Sodium 113 (LL) 136 - 145 mmol/L    Potassium 4.3 3.5 - 5.1 mmol/L    Chloride 76 (L) 98 - 107 mmol/L    CO2 24 21 - 32 mmol/L    Anion gap 13 mmol/L    Glucose 186 (H) 65 - 100 mg/dL    BUN 17 6 - 23 MG/DL    Creatinine 1.96 (H) 0.6 - 1.0 MG/DL    GFR est AA 35 (L) >60 ml/min/1.73m2    GFR est non-AA 29 ml/min/1.73m2    Calcium 9.2 8.3 - 10.4 MG/DL    Bilirubin, total 0.6 0.2 - 1.1 MG/DL    ALT (SGPT) 93 (H) 12 - 65 U/L    AST (SGOT) 73 (H) 15 - 37 U/L    Alk.  phosphatase 200 (H) 50 - 130 U/L    Protein, total 8.0 g/dL    Albumin 3.9 3.5 - 5.0 g/dL    Globulin 4.1 (H) 2.3 - 3.5 g/dL    A-G Ratio 1.0     POC LACTIC ACID    Collection Time: 01/12/19  7:48 PM   Result Value Ref Range    Lactic Acid (POC) 4.07 (H) 0.5 - 1.9 mmol/L   URINALYSIS W/ RFLX MICROSCOPIC    Collection Time: 01/12/19  8:59 PM   Result Value Ref Range    Color DAVIN      Appearance TURBID      Specific gravity 1.016 1.001 - 1.023      pH (UA) 5.5 5.0 - 9.0      Protein 30 (A) NEG mg/dL    Glucose NEGATIVE  mg/dL    Ketone TRACE (A) NEG mg/dL    Bilirubin SMALL (A) NEG      Blood NEGATIVE  NEG      Urobilinogen 1.0 0.2 - 1.0 EU/dL    Nitrites NEGATIVE  NEG      Leukocyte Esterase TRACE (A) NEG      WBC 0-3 0 /hpf    Epithelial cells 0-3 0 /hpf    Bacteria 2+ (H) 0 /hpf    Casts 10-20 0 /lpf   POC LACTIC ACID    Collection Time: 01/12/19 10:09 PM   Result Value Ref Range    Lactic Acid (POC) 5.63 (H) 0.5 - 1.9 mmol/L       Imaging Studies:  CXR Results  (Last 48 hours)    None        CT Results  (Last 48 hours)    None          Assessment and Plan:     Hospital Problems as of 1/12/2019 Date Reviewed: 11/12/2018          Codes Class Noted - Resolved POA    * (Principal) Acute diarrhea ICD-10-CM: R19.7  ICD-9-CM: 787.91  1/12/2019 - Present Yes        Acute hyponatremia ICD-10-CM: E87.1  ICD-9-CM: 276.1  1/12/2019 - Present Unknown        Acute UTI (urinary tract infection) ICD-10-CM: N39.0  ICD-9-CM: 599.0  1/12/2019 - Present Unknown        PENG (acute kidney injury) ([facility] ICD-10-CM: N17.9  ICD-9-CM: 584.9  1/12/2019 - Present Yes        Lactic acidosis ICD-10-CM: E87.2  ICD-9-CM: 276.2  1/12/2019 - Present Yes        Hypotension ICD-10-CM: I95.9  ICD-9-CM: 458.9  1/12/2019 - Present Yes        Leukocytosis ICD-10-CM: L67.545  ICD-9-CM: 288.60  1/12/2019 - Present Yes        Trigeminal neuralgia of right side of face ICD-10-CM: G50.0  ICD-9-CM: 350.1  11/7/2018 - Present Yes        Essential hypertension ICD-10-CM: I10  ICD-9-CM: 401.9  9/8/2016 - Present Yes        Dyslipidemia ICD-10-CM: E78.5  ICD-9-CM: 272.4  9/8/2016 - Present Yes        Elevated LFTs ICD-10-CM: R94.5  ICD-9-CM: 790.6  4/10/2015 - Present Yes    Overview Addendum 11/21/2018  9:53 AM by Belem Beaver MD     US: 5/2015 - normal  Iron studies and hepatitis panel negative             EVI (generalized anxiety disorder) ICD-10-CM: F41.1  ICD-9-CM: 300.02  1/14/2014 - Present Yes        TABITHA (obstructive sleep apnea) ICD-10-CM: G47.33  ICD-9-CM: 327.23  Unknown - Present Yes        Gastroesophageal reflux disease without esophagitis ICD-10-CM: K21.9  ICD-9-CM: 530.81  7/1/2013 - Present Yes    Overview Signed 6/8/2017 12:52 PM by Skye Perez MD     EGD: 6/2017 - normal                   PLAN:  · Admit inpatient to remote telemetry  · Bedrest w/ bedside commode  · Likely syncope x 3 today due to hypotension from acute hypovolemia  · IVF NS @ 150 cc/hr  · Place on seizure precautions with hyponatremia  · Patient received dose of vancomycin and aztreonam in ED,  · Suspect lactic acidosis from acute infectious diarrhea  · Will cover with IV cipro and flagyl. Add oral vancomycin if C diff positive  · She also has cystitis with hematuria  · Trend lactic acid, bolus fluids accordingly  · F/u blood and urine cultures  · Check stool for C difficile, WBC, shiga-like toxin  and culture  · Hyponatremia likely from acute fluid volume loss  · lexapro can also be causative so hold  · Check seum and urine Osm. Spot urine sodium  · Serial BMP. Check Mg, Phos, TSH  · Monitor orthostatics. Use PPI  · Clear liquids for now. Add PO probiotic  · Monitor LFTs. She states elevated in past. She is s/p cholecystectomy  · Has been on tylenol # 3 for pain. Stop acetaminophen products  · F/u trileptal level. Dose recently increased over past 2 weeks  · SCDs for DVT prophylaxis.  No anticoagulants as having hematuria      Estimated LOS:  2 or more midnights    Signed:  Kristin Figueroa MD

## 2019-01-13 NOTE — PROGRESS NOTES
TRANSFER - IN REPORT:    Verbal report received from [facility] (name) on [address]  being received from ED(unit) for routine progression of care      Report consisted of patients Situation, Background, Assessment and   Recommendations(SBAR). Information from the following report(s) SBAR, Kardex, ED Summary, Intake/Output, MAR, Accordion and Recent Results was reviewed with the receiving nurse. Opportunity for questions and clarification was provided. Assessment completed upon patients arrival to unit and care assumed.

## 2019-01-13 NOTE — PROGRESS NOTES
Pt states that her vision has been blurry since this a.m. States she fell at home & it the back of her head & fell on her face. Discussed this with dr. Sebastian Doan; states that we will monitor her blurry vision over the next 24 hours & then determine if an MRI might be needed if it does not improve. Pt in stable condition; oriented x4. Will continue to monitor.

## 2019-01-13 NOTE — PROGRESS NOTES
Problem: Falls - Risk of  Goal: *Absence of Falls  Document Rosendo Fall Risk and appropriate interventions in the flowsheet.   Outcome: Progressing Towards Goal  Fall Risk Interventions:  Mobility Interventions: Assess mobility with egress test         Medication Interventions: Patient to call before getting OOB         History of Falls Interventions: Door open when patient unattended

## 2019-01-13 NOTE — PROGRESS NOTES
A.M assessment complete; pt in bed with  at bedside. Alert & oriented. Resp even & unlabored on room air; lungs clear. HR regular. Abdomen soft & slightly tender with active bowel sounds. Pt has had multiple watery stools. No c/o nausea. IVF infusing with no difficulty. Bed low & locked; call light in reach; will continue to monitor.

## 2019-01-13 NOTE — ED TRIAGE NOTES
Pt progressively weak today, fell down three times, diarrhea today, pts medicines have been changed this week hx of trigeminal neuroglia

## 2019-01-13 NOTE — PROGRESS NOTES
Hospitalist Progress Note    2019  Admit Date: 2019  7:34 PM   NAME: Alek Otero   :  1970   MRN:  [id number]   Attending: Tania eReder MD  PCP:  Kizzy Venegas MD    SUBJECTIVE:     Alek Otero is a 50years old female with pmhx of HTN, GERD, trigeminal neuralgia, nephrolithiasis admitted on  for syncopal episode due to hypovolemia secondary to profuse diarrhea and dehydration. Pt was hypotensive on arrival, BP improved with fluid bolus but still borderline. Also, found to have cystitis with hematuria and hyponatremia of 113. Empirically started on IV cipro and oral flagyl. :  Pt seen at bedside. Reports feeling okay. Reports feeling slightly weak but otherwise fine. 3/10 abdominal discomfort, had 2 episodes of loose/watery BM's this AM.  No fever, chest pain, cough, SOB. Discussed about plan of care. Review of Systems negative with exception of pertinent positives noted above      PHYSICAL EXAM       Visit Vitals  BP 98/61 (BP 1 Location: Right arm, BP Patient Position: At rest)   Pulse 81   Temp 99 °F (37.2 °C)   Resp 16   Ht 5' 7\" (1.702 m)   Wt 79.4 kg (175 lb)   LMP 07/15/1998   SpO2 92%   BMI 27.41 kg/m²      Temp (24hrs), Av.5 °F (36.9 °C), Min:98 °F (36.7 °C), Max:99 °F (37.2 °C)    Oxygen Therapy  O2 Sat (%): 92 % (19 0342)  O2 Device: Room air (19 1917)    Intake/Output Summary (Last 24 hours) at 2019 0735  Last data filed at 2019 0146  Gross per 24 hour   Intake 1003 ml   Output    Net 1003 ml          General: No acute distress. Head:  Atraumatic Normocephalic. Eyes:  PERRLA, EOMI, Anicteric. ENT:  No discharges/lesions. Lungs:  CTA Bilaterally. CVS:  Regular rate and rhythm,  No murmur, rub, or gallop, No JVD, No lower   extremity edema. Abdomen: Soft, Non distended, Non tender, Positive bowel sounds. MSK:  No deformities, lesions, Spontaneously moves extremities. Neurologic:  AOx3.  No focal deficits  Psychiatry:      No anxiety/Depression  Skin:   No rash/lesions. Good skin turgor  Heme/Lymph/Immune:  No petechiae, ecchymoses, overt signs of bleeding or    lymphadenopathy noted. Recent Results (from the past 24 hour(s))   CBC WITH AUTOMATED DIFF    Collection Time: 01/12/19  7:39 PM   Result Value Ref Range    WBC 16.4 (H) 4.3 - 11.1 K/uL    RBC 5.32 (H) 4.05 - 5.2 M/uL    HGB 15.7 (H) 11.7 - 15.4 g/dL    HCT 44.3 35.8 - 46.3 %    MCV 83.3 79.6 - 97.8 FL    MCH 29.5 26.1 - 32.9 PG    MCHC 35.4 (H) 31.4 - 35.0 g/dL    RDW 12.3 11.9 - 14.6 %    PLATELET 818 141 - 064 K/uL    MPV 8.8 (L) 9.4 - 12.3 FL    ABSOLUTE NRBC 0.00 0.0 - 0.2 K/uL    DF AUTOMATED      NEUTROPHILS 91 (H) 43 - 78 %    LYMPHOCYTES 4 (L) 13 - 44 %    MONOCYTES 4 4.0 - 12.0 %    EOSINOPHILS 0 (L) 0.5 - 7.8 %    BASOPHILS 0 0.0 - 2.0 %    IMMATURE GRANULOCYTES 0 0.0 - 5.0 %    ABS. NEUTROPHILS 14.9 (H) 1.7 - 8.2 K/UL    ABS. LYMPHOCYTES 0.7 0.5 - 4.6 K/UL    ABS. MONOCYTES 0.7 0.1 - 1.3 K/UL    ABS. EOSINOPHILS 0.0 0.0 - 0.8 K/UL    ABS. BASOPHILS 0.1 0.0 - 0.2 K/UL    ABS. IMM. GRANS. 0.1 0.0 - 0.5 K/UL   METABOLIC PANEL, COMPREHENSIVE    Collection Time: 01/12/19  7:39 PM   Result Value Ref Range    Sodium 113 (LL) 136 - 145 mmol/L    Potassium 4.3 3.5 - 5.1 mmol/L    Chloride 76 (L) 98 - 107 mmol/L    CO2 24 21 - 32 mmol/L    Anion gap 13 mmol/L    Glucose 186 (H) 65 - 100 mg/dL    BUN 17 6 - 23 MG/DL    Creatinine 1.96 (H) 0.6 - 1.0 MG/DL    GFR est AA 35 (L) >60 ml/min/1.73m2    GFR est non-AA 29 ml/min/1.73m2    Calcium 9.2 8.3 - 10.4 MG/DL    Bilirubin, total 0.6 0.2 - 1.1 MG/DL    ALT (SGPT) 93 (H) 12 - 65 U/L    AST (SGOT) 73 (H) 15 - 37 U/L    Alk.  phosphatase 200 (H) 50 - 130 U/L    Protein, total 8.0 g/dL    Albumin 3.9 3.5 - 5.0 g/dL    Globulin 4.1 (H) 2.3 - 3.5 g/dL    A-G Ratio 1.0     PROCALCITONIN    Collection Time: 01/12/19  7:39 PM   Result Value Ref Range    Procalcitonin 3.8 ng/mL   POC LACTIC ACID    Collection Time: 01/12/19  7:48 PM   Result Value Ref Range    Lactic Acid (POC) 4.07 (H) 0.5 - 1.9 mmol/L   URINALYSIS W/ RFLX MICROSCOPIC    Collection Time: 01/12/19  8:59 PM   Result Value Ref Range    Color DAVIN      Appearance TURBID      Specific gravity 1.016 1.001 - 1.023      pH (UA) 5.5 5.0 - 9.0      Protein 30 (A) NEG mg/dL    Glucose NEGATIVE  mg/dL    Ketone TRACE (A) NEG mg/dL    Bilirubin SMALL (A) NEG      Blood NEGATIVE  NEG      Urobilinogen 1.0 0.2 - 1.0 EU/dL    Nitrites NEGATIVE  NEG      Leukocyte Esterase TRACE (A) NEG      WBC 0-3 0 /hpf    Epithelial cells 0-3 0 /hpf    Bacteria 2+ (H) 0 /hpf    Casts 10-20 0 /lpf   POC LACTIC ACID    Collection Time: 01/12/19 10:09 PM   Result Value Ref Range    Lactic Acid (POC) 5.63 (H) 0.5 - 1.9 mmol/L   METABOLIC PANEL, COMPREHENSIVE    Collection Time: 01/13/19  5:37 AM   Result Value Ref Range    Sodium 126 (L) 136 - 145 mmol/L    Potassium 3.9 3.5 - 5.1 mmol/L    Chloride 93 (L) 98 - 107 mmol/L    CO2 23 21 - 32 mmol/L    Anion gap 10 mmol/L    Glucose 109 (H) 65 - 100 mg/dL    BUN 12 6 - 23 MG/DL    Creatinine 1.02 (H) 0.6 - 1.0 MG/DL    GFR est AA >60 >60 ml/min/1.73m2    GFR est non-AA >60 ml/min/1.73m2    Calcium 7.8 (L) 8.3 - 10.4 MG/DL    Bilirubin, total 0.3 0.2 - 1.1 MG/DL    ALT (SGPT) 92 (H) 12 - 65 U/L    AST (SGOT) 54 (H) 15 - 37 U/L    Alk.  phosphatase 130 50 - 136 U/L    Protein, total 5.7 g/dL    Albumin 2.7 (L) 3.5 - 5.0 g/dL    Globulin 3.0 2.3 - 3.5 g/dL    A-G Ratio 0.9     CBC WITH AUTOMATED DIFF    Collection Time: 01/13/19  5:37 AM   Result Value Ref Range    WBC 8.1 4.3 - 11.1 K/uL    RBC 4.30 4.05 - 5.2 M/uL    HGB 12.5 11.7 - 15.4 g/dL    HCT 36.4 35.8 - 46.3 %    MCV 84.7 79.6 - 97.8 FL    MCH 29.1 26.1 - 32.9 PG    MCHC 34.3 31.4 - 35.0 g/dL    RDW 12.5 11.9 - 14.6 %    PLATELET 375 495 - 455 K/uL    MPV 8.2 (L) 9.4 - 12.3 FL    ABSOLUTE NRBC 0.00 0.0 - 0.2 K/uL    DF AUTOMATED      NEUTROPHILS 82 (H) 43 - 78 %    LYMPHOCYTES 11 (L) 13 - 44 %    MONOCYTES 6 4.0 - 12.0 %    EOSINOPHILS 0 (L) 0.5 - 7.8 %    BASOPHILS 0 0.0 - 2.0 %    IMMATURE GRANULOCYTES 0 0.0 - 5.0 %    ABS. NEUTROPHILS 6.7 1.7 - 8.2 K/UL    ABS. LYMPHOCYTES 0.9 0.5 - 4.6 K/UL    ABS. MONOCYTES 0.5 0.1 - 1.3 K/UL    ABS. EOSINOPHILS 0.0 0.0 - 0.8 K/UL    ABS. BASOPHILS 0.0 0.0 - 0.2 K/UL    ABS. IMM. GRANS. 0.0 0.0 - 0.5 K/UL   MAGNESIUM    Collection Time: 01/13/19  5:37 AM   Result Value Ref Range    Magnesium 2.0 1.8 - 2.4 mg/dL   PHOSPHORUS    Collection Time: 01/13/19  5:37 AM   Result Value Ref Range    Phosphorus 3.3 2.5 - 4.5 MG/DL   LACTIC ACID    Collection Time: 01/13/19  5:37 AM   Result Value Ref Range    Lactic acid 1.1 0.4 - 2.0 MMOL/L   TSH 3RD GENERATION    Collection Time: 01/13/19  5:37 AM   Result Value Ref Range    TSH 0.357 uIU/mL   C REACTIVE PROTEIN, QT    Collection Time: 01/13/19  5:37 AM   Result Value Ref Range    C-Reactive protein 6.5 (H) 0.0 - 0.9 mg/dL         Imaging /Procedures /Studies   All diagnostic imaging personally reviewed by me.     ASSESSMENT      Hospital Problems as of 1/13/2019 Date Reviewed: 11/12/2018          Codes Class Noted - Resolved POA    Acute hyponatremia ICD-10-CM: E87.1  ICD-9-CM: 276.1  1/12/2019 - Present Unknown        * (Principal) Acute diarrhea ICD-10-CM: R19.7  ICD-9-CM: 787.91  1/12/2019 - Present Yes        Acute UTI (urinary tract infection) ICD-10-CM: N39.0  ICD-9-CM: 599.0  1/12/2019 - Present Unknown        PENG (acute kidney injury) ([facility] [address] ICD-10-CM: N17.9  ICD-9-CM: 584.9  1/12/2019 - Present Yes        Hypotension ICD-10-CM: I95.9  ICD-9-CM: 458.9  1/12/2019 - Present Yes        Lactic acidosis ICD-10-CM: E87.2  ICD-9-CM: 276.2  1/12/2019 - Present Yes        Leukocytosis ICD-10-CM: D16.717  ICD-9-CM: 288.60  1/12/2019 - Present Yes        Trigeminal neuralgia of right side of face ICD-10-CM: G50.0  ICD-9-CM: 350.1  11/7/2018 - Present Yes        Essential hypertension ICD-10-CM: I10  ICD-9-CM: 401.9 9/8/2016 - Present Yes        Dyslipidemia ICD-10-CM: E78.5  ICD-9-CM: 272.4  9/8/2016 - Present Yes        Elevated LFTs ICD-10-CM: R94.5  ICD-9-CM: 790.6  4/10/2015 - Present Yes    Overview Addendum 11/21/2018  9:53 AM by Jluis Haile MD     US: 5/2015 - normal  Iron studies and hepatitis panel negative             EVI (generalized anxiety disorder) ICD-10-CM: F41.1  ICD-9-CM: 300.02  1/14/2014 - Present Yes        TABITHA (obstructive sleep apnea) ICD-10-CM: G47.33  ICD-9-CM: 327.23  Unknown - Present Yes        Gastroesophageal reflux disease without esophagitis ICD-10-CM: K21.9  ICD-9-CM: 530.81  7/1/2013 - Present Yes    Overview Signed 6/8/2017 12:52 PM by Jluis Haile MD     EGD: 6/2017 - normal                       Plan:  - Acute hyponatremia: from acute diarrhea. Resolving with IV fluids. 123 this AM.  - PENG: resolved with normalization of Creatinine from 1.96 to 0.95.  - Hypokalemia: due to diarrhea, 3.3 this AM. Continue oral and IV replacements. Recheck in AM  - Acute diarrhea: from viral/bacterial gastroenteritis. Continue empiric IV cipro and oral flagyl for now. florastor  F/u with stool studies. C.diff is pending. Continue contact precaution. Continue IV fluids  cc/hr  Prn bentyl and zofran.     DVT Prophylaxis: SCDs  Full liquid diet  Dispo; will likely go home once medically stable in next 1-2 days    Annamarie Gutierrez MD

## 2019-01-13 NOTE — PROGRESS NOTES
Pharmacokinetic Consult to Pharmacist    Nasrinedward Cisneros is a 50 y.o. female being treated for sepsis of unknown etiology with Vancomycin. Height: 5' 7\" (170.2 cm)  Weight: 79.4 kg (175 lb)  Lab Results   Component Value Date/Time    BUN 17 01/12/2019 07:39 PM    Creatinine 1.96 (H) 01/12/2019 07:39 PM    WBC 16.4 (H) 01/12/2019 07:39 PM    Procalcitonin 3.8 01/12/2019 07:39 PM    Lactic Acid (POC) 5.63 (H) 01/12/2019 10:09 PM      Estimated Creatinine Clearance: 38.1 mL/min (A) (based on SCr of 1.96 mg/dL (H)). CULTURES:  All Micro Results     Procedure Component Value Units Date/Time    CLOSTRIDIUM DIFF TOXIN A & B [[id number]] Collected:  01/12/19 2200    Order Status:  Canceled Specimen:  Stool     C. DIFFICILE AG & TOXIN A/B [[id number]] Collected:  01/12/19 2200    Order Status:  No result     CULTURE, STOOL [[id number]]     Order Status:  Sent Specimen:  Stool     CULTURE, URINE [[id number]] Collected:  01/12/19 2059    Order Status:  Completed Specimen:  Urine from Clean catch Updated:  01/12/19 2116    CULTURE, BLOOD [[id number]] Collected:  01/12/19 2016    Order Status:  Completed Specimen:  Blood Updated:  01/12/19 2023    CULTURE, BLOOD [[id number]] Collected:  01/12/19 2010    Order Status:  Completed Specimen:  Blood Updated:  01/12/19 2017            Day 1 of vancomycin. Goal trough is 15-20. Vancomycin dose initiated at 1000 mg X 1 dose; followed by Vanc 1500 mg IV q24h. Will continue to follow patient.       Thank you,  Krystle Laws, Pharm D.

## 2019-01-13 NOTE — ED NOTES
TRANSFER - OUT REPORT:    Verbal report given to   Stoney Lesches on [address]  being transferred to remote tele for routine progression of care       Report consisted of patients Situation, Background, Assessment and   Recommendations(SBAR). Information from the following report(s) SBAR was reviewed with the receiving nurse. Lines:   Peripheral IV 01/12/19 Anterior; Left Antecubital (Active)        Opportunity for questions and clarification was provided.       Patient transported with:   [employer]

## 2019-01-13 NOTE — PROGRESS NOTES
Chart screened by  for discharge planning. No needs identified at this time. Please consult  if any new issues arise.     Maine Carton, [place]    [address] [facility]  [email or web address]

## 2019-01-13 NOTE — PROGRESS NOTES
Problem: Falls - Risk of  Goal: *Absence of Falls  Document Rosendo Fall Risk and appropriate interventions in the flowsheet. Outcome: Progressing Towards Goal  Fall Risk Interventions:  Mobility Interventions: Patient to call before getting OOB         Medication Interventions: Patient to call before getting OOB, Teach patient to arise slowly    Elimination Interventions: Call light in reach, Patient to call for help with toileting needs, Toileting schedule/hourly rounds    History of Falls Interventions:  Investigate reason for fall

## 2019-01-13 NOTE — PROGRESS NOTES
Admission admission completed via doc flow sheet. Pt is alert and oriented x 4. No acute distress noted at this time. Abdomen soft and non tender. Pt is noted to have some redness on the face. Pt stated she fell 4 times today at home. No injury noted.  at bedside. Pt is instructed to call for assistance. Call light within reach.

## 2019-01-13 NOTE — ED PROVIDER NOTES
Patient is a 43-year-old female with history of hypertension, migraine headaches and trigeminal neuralgia who is  presenting with diarrhea and fatigue weakness and falls. She states her symptoms began this morning around 11 AM.  She denies any dysuria or fevers. She reports mild nausea but no vomiting. No significant abdominal pain. She states several days ago some of her medications were changed for her trigeminal neuralgia. She states she was started on steroids. She also is starting to discontinue her baclofen. Last dose last night. No previous abdominal surgery. She states she fell several times today and has some slight bruising around her left eye. She denies any headache, loss of consciousness or focal weakness. She denies any cough, chest pain or shortness of breath. The history is provided by the patient. No  was used.         Past Medical History:   Diagnosis Date    Allergic rhinitis 2013    Chronic LBP     and leg pain secondary nerve damage    DDD (degenerative disc disease), lumbar     EVI (generalized anxiety disorder) 2013    GERD (gastroesophageal reflux disease) 2013    HTN (hypertension), benign 2013    Kidney stone 2014    Migraine headache 2013    TABITHA (obstructive sleep apnea) 2008    not using CPAP now       Past Surgical History:   Procedure Laterality Date    HX  SECTION      HX HYSTERECTOMY  1998    bleeding    HX LAP CHOLECYSTECTOMY  10/2014    HX LUMBAR DISKECTOMY      HX LUMBAR DISKECTOMY  2013    L5 - S1    HX OTHER SURGICAL      Urethral dilation         Family History:   Problem Relation Age of Onset    Breast Cancer Mother 36        had 2nd episode-neg BRC    Hypertension Mother     Cancer Mother     Hypertension Father     Hypertension Brother     Breast Cancer Maternal Grandmother 61    Cancer Maternal Grandmother         Pancreatic    Diabetes Maternal Grandmother     Colon Cancer Maternal Grandfather 61    Stroke Paternal Grandmother     Diabetes Paternal Grandfather        Social History     Socioeconomic History    Marital status:      Spouse name: Chepe     Number of children: Not on file    Years of education: Not on file    Highest education level: Not on file   Social Needs    Financial resource strain: Not on file    Food insecurity - worry: Not on file    Food insecurity - inability: Not on file   Smile needs - medical: Not on file   Smile needs - non-medical: Not on file   Occupational History    Occupation: Teacher    Tobacco Use    Smoking status: Never Smoker    Smokeless tobacco: Never Used   Substance and Sexual Activity    Alcohol use: No    Drug use: No    Sexual activity: Not on file   Other Topics Concern    Not on file   Social History Narrative    Not on file         ALLERGIES: Flonase [fluticasone]; Keflex [cephalexin]; Penicillins; and Sulfa (sulfonamide antibiotics)    Review of Systems   Constitutional: Positive for fatigue. Negative for fever. HENT: Negative for congestion. Respiratory: Negative for cough and shortness of breath. Cardiovascular: Negative for chest pain. Gastrointestinal: Positive for diarrhea and nausea. Negative for abdominal pain and vomiting. Genitourinary: Negative for dysuria and flank pain. Musculoskeletal: Negative for back pain. Neurological: Positive for weakness. Negative for headaches. Psychiatric/Behavioral: Negative for confusion. Vitals:    01/12/19 1917 01/12/19 1956   BP: (!) 80/46 92/57   Pulse: 76 67   Resp: 18 14   Temp: 98 °F (36.7 °C)    SpO2: 94% 98%   Weight: 79.4 kg (175 lb)    Height: 5' 7\" (1.702 m)             Physical Exam   Constitutional: She is oriented to person, place, and time. She appears well-developed and well-nourished. No distress. HENT:   Head: Normocephalic. Slight bruising around the left eye.    Eyes: Conjunctivae and EOM are normal. Pupils are equal, round, and reactive to light. Neck: Normal range of motion. Neck supple. Cardiovascular: Normal rate, regular rhythm and normal heart sounds. Pulmonary/Chest: Effort normal and breath sounds normal. No respiratory distress. She has no wheezes. She has no rales. Abdominal: Soft. She exhibits no distension. There is no tenderness. There is no rebound. Benign abdominal exam   Musculoskeletal: Normal range of motion. She exhibits no edema or tenderness. Neurological: She is alert and oriented to person, place, and time. Clear speech. Good strength in both upper and lower extremities   Skin: Skin is warm and dry. No rash noted. She is not diaphoretic. Psychiatric: She has a normal mood and affect. Her behavior is normal.   Nursing note and vitals reviewed. MDM  Number of Diagnoses or Management Options  Acute hyponatremia: new and requires workup  Acute kidney injury [facility]): new and requires workup  Diarrhea, unspecified type: new and requires workup  Hypotension, unspecified hypotension type: new and requires workup  Severe sepsis [facility]): new and requires workup  Diagnosis management comments: Patient meeting criteria for severe sepsis. Fluids and broad-spectrum antibiotics started in the ED. Significantly hyponatremic. Elevated creatinine. Source appears to be urinary at this time. No abdominal pain on repeat examinations. We'll admission to the hospitalist service for further evaluation treatment. Blood pressures improved with hydration. Cori Rodriguez MD; 1/12/2019 @11:03 PM Voice dictation software was used during the making of this note. This software is not perfect and grammatical and other typographical errors may be present.   This note has not been proofread for errors.  ===================================================================         Amount and/or Complexity of Data Reviewed  Clinical lab tests: reviewed and ordered (Results for orders placed or performed during the hospital encounter of 01/12/19  -CBC WITH AUTOMATED DIFF       Result                      Value             Ref Range           WBC                         16.4 (H)          4.3 - 11.1 K*       RBC                         5.32 (H)          4.05 - 5.2 M*       HGB                         15.7 (H)          11.7 - 15.4 *       HCT                         44.3              35.8 - 46.3 %       MCV                         83.3              79.6 - 97.8 *       MCH                         29.5              26.1 - 32.9 *       MCHC                        35.4 (H)          31.4 - 35.0 *       RDW                         12.3              11.9 - 14.6 %       PLATELET                    415               150 - 450 K/*       MPV                         8.8 (L)           9.4 - 12.3 FL       ABSOLUTE NRBC               0.00              0.0 - 0.2 K/*       DF                          AUTOMATED                             NEUTROPHILS                 91 (H)            43 - 78 %           LYMPHOCYTES                 4 (L)             13 - 44 %           MONOCYTES                   4                 4.0 - 12.0 %        EOSINOPHILS                 0 (L)             0.5 - 7.8 %         BASOPHILS                   0                 0.0 - 2.0 %         IMMATURE GRANULOCYTES       0                 0.0 - 5.0 %         ABS. NEUTROPHILS            14.9 (H)          1.7 - 8.2 K/*       ABS. LYMPHOCYTES            0.7               0.5 - 4.6 K/*       ABS. MONOCYTES              0.7               0.1 - 1.3 K/*       ABS. EOSINOPHILS            0.0               0.0 - 0.8 K/*       ABS. BASOPHILS              0.1               0.0 - 0.2 K/*       ABS. IMM.  GRANS.            0.1               0.0 - 0.5 K/*  -METABOLIC PANEL, COMPREHENSIVE       Result                      Value             Ref Range           Sodium                      113 (LL)          136 - 145 mm*       Potassium                   4.3               3.5 - 5.1 mm*       Chloride                    76 (L)            98 - 107 mmo*       CO2                         24                21 - 32 mmol*       Anion gap                   13                mmol/L              Glucose                     186 (H)           65 - 100 mg/*       BUN                         17                6 - 23 MG/DL        Creatinine                  1.96 (H)          0.6 - 1.0 MG*       GFR est AA                  35 (L)            >60 ml/min/1*       GFR est non-AA              29                ml/min/1.73m2       Calcium                     9.2               8.3 - 10.4 M*       Bilirubin, total            0.6               0.2 - 1.1 MG*       ALT (SGPT)                  93 (H)            12 - 65 U/L         AST (SGOT)                  73 (H)            15 - 37 U/L         Alk.  phosphatase            200 (H)           50 - 130 U/L        Protein, total              8.0               g/dL                Albumin                     3.9               3.5 - 5.0 g/*       Globulin                    4.1 (H)           2.3 - 3.5 g/*       A-G Ratio                   1.0                              -URINALYSIS W/ RFLX MICROSCOPIC       Result                      Value             Ref Range           Color                       DAVIN                                 Appearance                  TURBID                                Specific gravity            1.016             1.001 - 1.02*       pH (UA)                     5.5               5.0 - 9.0           Protein                     30 (A)            NEG mg/dL           Glucose                     NEGATIVE          mg/dL               Ketone                      TRACE (A)         NEG mg/dL           Bilirubin                   SMALL (A)         NEG                 Blood                       NEGATIVE          NEG                 Urobilinogen                1.0               0.2 - 1.0 EU*       Nitrites                    NEGATIVE          NEG Leukocyte Esterase          TRACE (A)         NEG                 WBC                         0-3               0 /hpf              Epithelial cells            0-3               0 /hpf              Bacteria                    2+ (H)            0 /hpf              Casts                       10-20             0 /lpf         -POC LACTIC ACID       Result                      Value             Ref Range           Lactic Acid (POC)           4.07 (H)          0.5 - 1.9 mm*  -POC LACTIC ACID       Result                      Value             Ref Range           Lactic Acid (POC)           5.63 (H)          0.5 - 1.9 mm*  )  Review and summarize past medical records: yes  Discuss the patient with other providers: yes  Independent visualization of images, tracings, or specimens: yes    Risk of Complications, Morbidity, and/or Mortality  Presenting problems: high  Diagnostic procedures: high  Management options: high    Patient Progress  Patient progress: stable         Procedures

## 2019-01-13 NOTE — PROGRESS NOTES
01/12/19 2222   Dual Skin Pressure Injury Assessment   Dual Skin Pressure Injury Assessment WDL   Second Care Provider (Based on Facility Policy) Caro Mccracken   Skin Integumentary   Skin Integumentary (WDL) WDL   redness noted to left side of the face.

## 2019-01-14 LAB
ANION GAP SERPL CALC-SCNC: 11 MMOL/L
ANION GAP SERPL CALC-SCNC: 5 MMOL/L
ANION GAP SERPL CALC-SCNC: 6 MMOL/L
ANION GAP SERPL CALC-SCNC: 7 MMOL/L
BUN SERPL-MCNC: 5 MG/DL (ref 6–23)
BUN SERPL-MCNC: 6 MG/DL (ref 6–23)
BUN SERPL-MCNC: 6 MG/DL (ref 6–23)
BUN SERPL-MCNC: 7 MG/DL (ref 6–23)
C DIFF GDH STL QL: NORMAL
C DIFF TOX A+B STL QL IA: NORMAL
CALCIUM SERPL-MCNC: 7.9 MG/DL (ref 8.3–10.4)
CALCIUM SERPL-MCNC: 7.9 MG/DL (ref 8.3–10.4)
CALCIUM SERPL-MCNC: 8.2 MG/DL (ref 8.3–10.4)
CALCIUM SERPL-MCNC: 8.3 MG/DL (ref 8.3–10.4)
CHLORIDE SERPL-SCNC: 100 MMOL/L (ref 98–107)
CHLORIDE SERPL-SCNC: 102 MMOL/L (ref 98–107)
CHLORIDE SERPL-SCNC: 105 MMOL/L (ref 98–107)
CHLORIDE SERPL-SCNC: 99 MMOL/L (ref 98–107)
CLINICAL CONSIDERATION: NORMAL
CO2 SERPL-SCNC: 21 MMOL/L (ref 21–32)
CO2 SERPL-SCNC: 24 MMOL/L (ref 21–32)
CO2 SERPL-SCNC: 25 MMOL/L (ref 21–32)
CO2 SERPL-SCNC: 28 MMOL/L (ref 21–32)
CREAT SERPL-MCNC: 0.71 MG/DL (ref 0.6–1)
CREAT SERPL-MCNC: 0.75 MG/DL (ref 0.6–1)
CREAT SERPL-MCNC: 0.77 MG/DL (ref 0.6–1)
CREAT SERPL-MCNC: 0.81 MG/DL (ref 0.6–1)
GLUCOSE SERPL-MCNC: 138 MG/DL (ref 65–100)
GLUCOSE SERPL-MCNC: 83 MG/DL (ref 65–100)
GLUCOSE SERPL-MCNC: 83 MG/DL (ref 65–100)
GLUCOSE SERPL-MCNC: 88 MG/DL (ref 65–100)
INTERPRETATION: NORMAL
PCR REFLEX: NORMAL
POTASSIUM SERPL-SCNC: 4.3 MMOL/L (ref 3.5–5.1)
POTASSIUM SERPL-SCNC: 4.6 MMOL/L (ref 3.5–5.1)
POTASSIUM SERPL-SCNC: 4.8 MMOL/L (ref 3.5–5.1)
POTASSIUM SERPL-SCNC: 4.8 MMOL/L (ref 3.5–5.1)
SODIUM SERPL-SCNC: 132 MMOL/L (ref 136–145)
SODIUM SERPL-SCNC: 132 MMOL/L (ref 136–145)
SODIUM SERPL-SCNC: 133 MMOL/L (ref 136–145)
SODIUM SERPL-SCNC: 136 MMOL/L (ref 136–145)
WBC #/AREA STL HPF: NORMAL /HPF (ref 0–4)

## 2019-01-14 PROCEDURE — 74011250637 HC RX REV CODE- 250/637: Performed by: HOSPITALIST

## 2019-01-14 PROCEDURE — 74011250636 HC RX REV CODE- 250/636: Performed by: HOSPITALIST

## 2019-01-14 PROCEDURE — 77030020263 HC SOL INJ SOD CL0.9% LFCR 1000ML

## 2019-01-14 PROCEDURE — 80048 BASIC METABOLIC PNL TOTAL CA: CPT

## 2019-01-14 PROCEDURE — 65660000000 HC RM CCU STEPDOWN

## 2019-01-14 PROCEDURE — 36415 COLL VENOUS BLD VENIPUNCTURE: CPT

## 2019-01-14 RX ADMIN — SODIUM CHLORIDE 100 ML/HR: 900 INJECTION, SOLUTION INTRAVENOUS at 14:21

## 2019-01-14 RX ADMIN — Medication 250 MG: at 08:23

## 2019-01-14 RX ADMIN — GABAPENTIN 100 MG: 100 CAPSULE ORAL at 08:24

## 2019-01-14 RX ADMIN — GABAPENTIN 100 MG: 100 CAPSULE ORAL at 17:14

## 2019-01-14 RX ADMIN — POTASSIUM CHLORIDE 40 MEQ: 20 TABLET, EXTENDED RELEASE ORAL at 08:23

## 2019-01-14 RX ADMIN — SODIUM CHLORIDE 150 ML/HR: 900 INJECTION, SOLUTION INTRAVENOUS at 04:33

## 2019-01-14 RX ADMIN — CIPROFLOXACIN 400 MG: 2 INJECTION, SOLUTION INTRAVENOUS at 08:24

## 2019-01-14 RX ADMIN — GABAPENTIN 100 MG: 100 CAPSULE ORAL at 12:18

## 2019-01-14 RX ADMIN — PANTOPRAZOLE SODIUM 40 MG: 40 TABLET, DELAYED RELEASE ORAL at 08:24

## 2019-01-14 RX ADMIN — Medication 10 ML: at 23:03

## 2019-01-14 RX ADMIN — METRONIDAZOLE 500 MG: 500 INJECTION, SOLUTION INTRAVENOUS at 23:04

## 2019-01-14 RX ADMIN — CIPROFLOXACIN 400 MG: 2 INJECTION, SOLUTION INTRAVENOUS at 20:34

## 2019-01-14 RX ADMIN — OXCARBAZEPINE 1200 MG: 600 TABLET, FILM COATED ORAL at 20:35

## 2019-01-14 RX ADMIN — Medication 10 ML: at 14:19

## 2019-01-14 RX ADMIN — METRONIDAZOLE 500 MG: 500 INJECTION, SOLUTION INTRAVENOUS at 14:19

## 2019-01-14 RX ADMIN — GABAPENTIN 100 MG: 100 CAPSULE ORAL at 23:03

## 2019-01-14 RX ADMIN — Medication 250 MG: at 17:14

## 2019-01-14 RX ADMIN — METRONIDAZOLE 500 MG: 500 INJECTION, SOLUTION INTRAVENOUS at 06:15

## 2019-01-14 RX ADMIN — OXCARBAZEPINE 1200 MG: 600 TABLET, FILM COATED ORAL at 08:24

## 2019-01-14 NOTE — PROGRESS NOTES
Resting quietly, awake, resp even, unlab, skin warm, dry. AP 80, regular, lungs sounds clear, abdom soft with active bowel sounds. Assessed as noted. Reports voiding without difficulty, having loose stools. No needs, no c/o, no distress noted.

## 2019-01-14 NOTE — PROGRESS NOTES
Problem: Nutrition Deficit  Goal: *Optimize nutritional status  Nutrition Assessment for: MST BPA for unplanned wt loss and eating poorly r/t decreased appetite. Assessment:   Pt with hx HTN, GERD, trigeminal neuralgia, kidney stone  Who presents to ED today with near syncope x 3 and 1 day acute onset of profuse diarrhea. States has had at least 5-6 high volume diarrhea today and her  watched her collapse coming out of restroom. She hit her face and has bruises. No seizures. Denies abdominal pain. Has noticed some hematuria but no flank pain. Has had difficulty with her right facial trigeminal neuralgia over the past 2 weeks and has been on a course of prednisone as well. \"  Pt admitted notably for hypovolemia secondary to diarrhea and dehydration and for viral/bacterial gastroenteritis and PENG. C-Diff ordered. Spoke with pt who notes that her appetite is typically good but that she lost some weight recently when her facial trigeminal neuralgia was presently symptoms and had limited intake except by straw. Notes that otherwise her weight is stable and that she is actually trying to reduce wt for health- \"but not this way. \"  Notes she is eating 100% of full liquid diet. Anthropometrics:  Height: 5' 7\" (1.702m), Weight: 79.4 kg (175 lb), source not stated, BMI: 27.41 kg/m². (Overweight BMI Class)    Macronutrient needs:  EER: 9767-5186 kcal/day  (20-25 kcal/kg actual BW)  EPR: 49-74 gm/day   (0.8-1.2 gm/kg IBW) PENG    Intake/Comparative Standards: Patient consuming 100% of full liquid diet and 100% of Ensure Enlive as pm snack. This meets 100% of kcal needs and 100% of protein needs. Nutrition Diagnosis:  Loss of weight related to trigeminal neuralgia as evidenced by pt's report of limited capacity for food textures when symptoms flare. Nutrition Intervention:  Meals and snacks: Full liquid diet order. MD to advance as appropriate. Discharge Plan: Too soon to determine.     Jose Santillan, MS, RD, [id number], [address]

## 2019-01-14 NOTE — PROGRESS NOTES
Resting quietly, awake, resp even, unlab, skin warm, dry. AP 80, regular, lungs sounds clear. Abom soft with active bowel sounds. Voids without difficulty, having loose brown stools. Assessment noted. Aware to call for asst to be out of bed. Reports family member at side assisting. No c/o. No distress.

## 2019-01-14 NOTE — PROGRESS NOTES
Problem: Falls - Risk of  Goal: *Absence of Falls  Document Rosendo Fall Risk and appropriate interventions in the flowsheet.   Outcome: Progressing Towards Goal  Fall Risk Interventions:  Mobility Interventions: Bed/chair exit alarm         Medication Interventions: Bed/chair exit alarm    Elimination Interventions: Call light in reach    History of Falls Interventions: Bed/chair exit alarm

## 2019-01-14 NOTE — PROGRESS NOTES
Continues to rest quietly, arousing easily at intervals during the night, voiding with some loose brown stools noted, not with every void. No c/o. No distress.

## 2019-01-14 NOTE — PROGRESS NOTES
Hospitalist Progress Note    2019  Admit Date: 2019  7:34 PM   NAME: Tristan Murillo   :  1970   MRN:  [id number]   Attending: Lissy Graff MD  PCP:  Latosha Sarabia MD    SUBJECTIVE:     Tristan Murillo is a 50years old female with pmhx of HTN, GERD, trigeminal neuralgia, nephrolithiasis admitted on  for syncopal episode due to hypovolemia secondary to profuse diarrhea and dehydration. Pt was hypotensive on arrival, BP improved with fluid bolus but still borderline. Also, found to have cystitis with hematuria and hyponatremia of 113. Empirically started on IV cipro and oral flagyl. :  Pt evaluated at bedside. She continues to feel better. No abdominal pain, nausea/vomiting, fever, headache. Blurry vision reported yesterday, has resolved. Stools are less frequent and getting slightly formed, but still loose        Review of Systems negative with exception of pertinent positives noted above      PHYSICAL EXAM       Visit Vitals  /69   Pulse 67   Temp 97.5 °F (36.4 °C)   Resp 18   Ht 5' 7\" (1.702 m)   Wt 79.4 kg (175 lb)   LMP 07/15/1998   SpO2 96%   BMI 27.41 kg/m²      Temp (24hrs), Av °F (36.7 °C), Min:97.5 °F (36.4 °C), Max:98.5 °F (36.9 °C)    Oxygen Therapy  O2 Sat (%): 96 % (19)  O2 Device: Room air (19)    Intake/Output Summary (Last 24 hours) at 2019 0734  Last data filed at 2019 0427  Gross per 24 hour   Intake 5506 ml   Output 2302 ml   Net 3204 ml          General: No acute distress. Head:  Atraumatic Normocephalic. Eyes:  PERRLA, EOMI, Anicteric. ENT:  No discharges/lesions. Lungs:  CTA Bilaterally. CVS:  Regular rate and rhythm,  No murmur, rub, or gallop, No JVD, No lower   extremity edema. Abdomen: Soft, Non distended, Non tender, Positive bowel sounds. MSK:  No deformities, lesions, Spontaneously moves extremities. Neurologic:  AOx3.  No focal deficits  Psychiatry:      No anxiety/Depression  Skin:   No rash/lesions. Good skin turgor  Heme/Lymph/Immune:  No petechiae, ecchymoses, overt signs of bleeding or    lymphadenopathy noted.     Recent Results (from the past 24 hour(s))   METABOLIC PANEL, BASIC    Collection Time: 01/13/19 10:08 AM   Result Value Ref Range    Sodium 131 (L) 136 - 145 mmol/L    Potassium 3.3 (L) 3.5 - 5.1 mmol/L    Chloride 99 98 - 107 mmol/L    CO2 20 (L) 21 - 32 mmol/L    Anion gap 12 mmol/L    Glucose 137 (H) 65 - 100 mg/dL    BUN 10 6 - 23 MG/DL    Creatinine 0.95 0.6 - 1.0 MG/DL    GFR est AA >60 >60 ml/min/1.73m2    GFR est non-AA >60 ml/min/1.73m2    Calcium 7.8 (L) 8.3 - 00.6 MG/DL   METABOLIC PANEL, BASIC    Collection Time: 01/13/19  4:15 PM   Result Value Ref Range    Sodium 133 (L) 136 - 145 mmol/L    Potassium 3.9 3.5 - 5.1 mmol/L    Chloride 101 98 - 107 mmol/L    CO2 25 21 - 32 mmol/L    Anion gap 7 mmol/L    Glucose 101 (H) 65 - 100 mg/dL    BUN 7 6 - 23 MG/DL    Creatinine 0.84 0.6 - 1.0 MG/DL    GFR est AA >60 >60 ml/min/1.73m2    GFR est non-AA >60 ml/min/1.73m2    Calcium 7.8 (L) 8.3 - 05.1 MG/DL   METABOLIC PANEL, BASIC    Collection Time: 01/13/19 10:16 PM   Result Value Ref Range    Sodium 134 (L) 136 - 145 mmol/L    Potassium 4.7 3.5 - 5.1 mmol/L    Chloride 103 98 - 107 mmol/L    CO2 24 21 - 32 mmol/L    Anion gap 7 mmol/L    Glucose 122 (H) 65 - 100 mg/dL    BUN 8 6 - 23 MG/DL    Creatinine 0.79 0.6 - 1.0 MG/DL    GFR est AA >60 >60 ml/min/1.73m2    GFR est non-AA >60 ml/min/1.73m2    Calcium 7.7 (L) 8.3 - 54.0 MG/DL   METABOLIC PANEL, BASIC    Collection Time: 01/14/19  5:20 AM   Result Value Ref Range    Sodium 136 136 - 145 mmol/L    Potassium 4.8 3.5 - 5.1 mmol/L    Chloride 105 98 - 107 mmol/L    CO2 24 21 - 32 mmol/L    Anion gap 7 mmol/L    Glucose 83 65 - 100 mg/dL    BUN 6 6 - 23 MG/DL    Creatinine 0.77 0.6 - 1.0 MG/DL    GFR est AA >60 >60 ml/min/1.73m2    GFR est non-AA >60 ml/min/1.73m2    Calcium 7.9 (L) 8.3 - 10.4 MG/DL         Imaging /Procedures /Studies   All diagnostic imaging personally reviewed by me. ASSESSMENT      Hospital Problems as of 1/14/2019 Date Reviewed: 11/12/2018          Codes Class Noted - Resolved POA    Acute hyponatremia ICD-10-CM: E87.1  ICD-9-CM: 276.1  1/12/2019 - Present Unknown        * (Principal) Acute diarrhea ICD-10-CM: R19.7  ICD-9-CM: 787.91  1/12/2019 - Present Yes        Acute UTI (urinary tract infection) ICD-10-CM: N39.0  ICD-9-CM: 599.0  1/12/2019 - Present Unknown        PENG (acute kidney injury) ([facility] [address] ICD-10-CM: N17.9  ICD-9-CM: 584.9  1/12/2019 - Present Yes        Hypotension ICD-10-CM: I95.9  ICD-9-CM: 458.9  1/12/2019 - Present Yes        Lactic acidosis ICD-10-CM: E87.2  ICD-9-CM: 276.2  1/12/2019 - Present Yes        Leukocytosis ICD-10-CM: D72.829  ICD-9-CM: 288.60  1/12/2019 - Present Yes        Trigeminal neuralgia of right side of face ICD-10-CM: G50.0  ICD-9-CM: 350.1  11/7/2018 - Present Yes        Essential hypertension ICD-10-CM: I10  ICD-9-CM: 401.9  9/8/2016 - Present Yes        Dyslipidemia ICD-10-CM: E78.5  ICD-9-CM: 272.4  9/8/2016 - Present Yes        Elevated LFTs ICD-10-CM: R94.5  ICD-9-CM: 790.6  4/10/2015 - Present Yes    Overview Addendum 11/21/2018  9:53 AM by Yosvany Maldonado MD     US: 5/2015 - normal  Iron studies and hepatitis panel negative             EVI (generalized anxiety disorder) ICD-10-CM: F41.1  ICD-9-CM: 300.02  1/14/2014 - Present Yes        TABITHA (obstructive sleep apnea) ICD-10-CM: G47.33  ICD-9-CM: 327.23  Unknown - Present Yes        Gastroesophageal reflux disease without esophagitis ICD-10-CM: K21.9  ICD-9-CM: 530.81  7/1/2013 - Present Yes    Overview Signed 6/8/2017 12:52 PM by Yosvany Maldonado MD     EGD: 6/2017 - normal                       Plan:  - Acute hyponatremia: resolved, 136 today  - PENG: resolved with normalization of Creatinine from 1.96 to 0.95.  - Hypokalemia: corrected, 4.8 today  - Acute diarrhea: from viral/bacterial gastroenteritis.  Continue empiric IV cipro and oral flagyl for now. florastor  F/u with stool studies. C.diff is pending. Continue contact precaution. IV fluids cut down from 150 to 100 cc/hr  Prn bentyl and zofran. DVT Prophylaxis: SCDs  Advance diet  Dispo; will likely go home tomorrow.     Tosha Watkins MD

## 2019-01-14 NOTE — PROGRESS NOTES
Shift assessment complete. Pt resting in bed. A&O x4. Respirations present, even, unlabored. Lung sounds clear to auscultation. HR regular, S1&S2 auscultated. IVF infusing without difficulty. Bilateral SCDs in place. Encouraged to call for help when needed. All needs met at this time. Bed in lowest position, call light within reach, side rails x3. Will continue to monitor throughout the shift.

## 2019-01-15 VITALS
TEMPERATURE: 97.9 F | SYSTOLIC BLOOD PRESSURE: 153 MMHG | DIASTOLIC BLOOD PRESSURE: 93 MMHG | HEIGHT: 67 IN | OXYGEN SATURATION: 97 % | BODY MASS INDEX: 27.47 KG/M2 | HEART RATE: 70 BPM | RESPIRATION RATE: 18 BRPM | WEIGHT: 175 LBS

## 2019-01-15 LAB
ANION GAP SERPL CALC-SCNC: 9 MMOL/L
BACTERIA SPEC CULT: NORMAL
BUN SERPL-MCNC: 8 MG/DL (ref 6–23)
CALCIUM SERPL-MCNC: 8.1 MG/DL (ref 8.3–10.4)
CHLORIDE SERPL-SCNC: 100 MMOL/L (ref 98–107)
CO2 SERPL-SCNC: 21 MMOL/L (ref 21–32)
CREAT SERPL-MCNC: 0.68 MG/DL (ref 0.6–1)
GLUCOSE SERPL-MCNC: 90 MG/DL (ref 65–100)
POTASSIUM SERPL-SCNC: 5 MMOL/L (ref 3.5–5.1)
SERVICE CMNT-IMP: NORMAL
SODIUM SERPL-SCNC: 130 MMOL/L (ref 136–145)

## 2019-01-15 PROCEDURE — 77030020255 HC SOL INJ LR 1000ML BG

## 2019-01-15 PROCEDURE — 36415 COLL VENOUS BLD VENIPUNCTURE: CPT

## 2019-01-15 PROCEDURE — 74011250636 HC RX REV CODE- 250/636: Performed by: HOSPITALIST

## 2019-01-15 PROCEDURE — 77030020263 HC SOL INJ SOD CL0.9% LFCR 1000ML

## 2019-01-15 PROCEDURE — 74011250637 HC RX REV CODE- 250/637: Performed by: HOSPITALIST

## 2019-01-15 PROCEDURE — 80048 BASIC METABOLIC PNL TOTAL CA: CPT

## 2019-01-15 RX ORDER — SAME BUTANEDISULFONATE/BETAINE 400-600 MG
250 POWDER IN PACKET (EA) ORAL 2 TIMES DAILY
Qty: 14 CAP | Refills: 0 | Status: SHIPPED | OUTPATIENT
Start: 2019-01-15 | End: 2019-01-22

## 2019-01-15 RX ORDER — METRONIDAZOLE 500 MG/1
500 TABLET ORAL 3 TIMES DAILY
Qty: 15 TAB | Refills: 0 | Status: SHIPPED | OUTPATIENT
Start: 2019-01-15 | End: 2019-01-20

## 2019-01-15 RX ORDER — DIPHENOXYLATE HYDROCHLORIDE AND ATROPINE SULFATE 2.5; .025 MG/1; MG/1
2 TABLET ORAL
Qty: 30 TAB | Refills: 0 | Status: SHIPPED | OUTPATIENT
Start: 2019-01-15

## 2019-01-15 RX ORDER — CIPROFLOXACIN 500 MG/1
500 TABLET ORAL 2 TIMES DAILY
Qty: 10 TAB | Refills: 0 | Status: SHIPPED | OUTPATIENT
Start: 2019-01-15 | End: 2019-01-20

## 2019-01-15 RX ADMIN — SODIUM CHLORIDE 100 ML/HR: 900 INJECTION, SOLUTION INTRAVENOUS at 04:26

## 2019-01-15 RX ADMIN — METRONIDAZOLE 500 MG: 500 INJECTION, SOLUTION INTRAVENOUS at 06:07

## 2019-01-15 RX ADMIN — Medication 10 ML: at 06:07

## 2019-01-15 RX ADMIN — Medication 250 MG: at 08:28

## 2019-01-15 RX ADMIN — CIPROFLOXACIN 400 MG: 2 INJECTION, SOLUTION INTRAVENOUS at 08:28

## 2019-01-15 RX ADMIN — OXCARBAZEPINE 1200 MG: 600 TABLET, FILM COATED ORAL at 08:28

## 2019-01-15 RX ADMIN — PANTOPRAZOLE SODIUM 40 MG: 40 TABLET, DELAYED RELEASE ORAL at 08:28

## 2019-01-15 RX ADMIN — GABAPENTIN 100 MG: 100 CAPSULE ORAL at 08:28

## 2019-01-15 NOTE — DISCHARGE SUMMARY
Hospitalist Discharge Summary     Patient ID:  Cherry Sexton  [id number]  89 y.o.  1970  Admit date: 1/12/2019  7:34 PM  Discharge date and time: 1/15/2019  Attending: Monse Ha MD  PCP:  Jacqueline Bennett MD  Treatment Team: Attending Provider: Monse Ha MD; Utilization Review: Andreas Manning RN    Principal Diagnosis:  Syncopal episode  Dehydration  Viral gastroenteritis  Acute diarrhea  Hyponatremia  Acute renal failure          Principal Problem:    Acute diarrhea (1/12/2019)    Active Problems:    TABITHA (obstructive sleep apnea) ()      EVI (generalized anxiety disorder) (1/14/2014)      Gastroesophageal reflux disease without esophagitis (7/1/2013)      Overview: EGD: 6/2017 - normal      Elevated LFTs (4/10/2015)      Overview: US: 5/2015 - normal      Iron studies and hepatitis panel negative      Essential hypertension (9/8/2016)      Dyslipidemia (9/8/2016)      Trigeminal neuralgia of right side of face (11/7/2018)      Acute hyponatremia (1/12/2019)      Acute UTI (urinary tract infection) (1/12/2019)      PENG (acute kidney injury) ([facility] [address] (1/12/2019)      Hypotension (1/12/2019)      Lactic acidosis (1/12/2019)      Leukocytosis (1/12/2019)             Hospital Course:  Please refer to the admission H&P for details of presentation. In summary, the patient is  50years old female with pmhx of HTN, GERD, trigeminal neuralgia, nephrolithiasis admitted on 1/13 for syncopal episode due to hypovolemia secondary to profuse diarrhea and dehydration. Pt was hypotensive on arrival, BP improved with fluid bolus but still borderline. Also, found to have cystitis with hematuria and hyponatremia of 113. Empirically started on IV cipro and oral flagyl. Hyponatremia resolved with IV fluids, 130 at the time of discharge. She is also counseled to stop chlorthalidone until she is evaluated by PCP in 1 week. Acute renal failure also resolved with hydration. C.diff was negative.  Pt was started on florastor and lomotil, which helped in decreasing stool frequency. Stools are less frequent and getting more formed. She is tolerating oral intake. She is hemodynamically stable and medically cleared for discharge. Rest of the hospital course was uneventful. For further details, please refer to daily progress notes. Significant Diagnostic Studies:   No diagnostic imaging for this admission  EKG showed NSR    Labs: Results:       Chemistry Recent Labs     01/15/19  0433 01/14/19  2206 01/14/19  1610  01/13/19  0537 01/12/19 1939   GLU 90 138* 88   < > 109* 186*   * 132* 132*   < > 126* 113*   K 5.0 4.3 4.8   < > 3.9 4.3    100 99   < > 93* 76*   CO2 21 21 28   < > 23 24   BUN 8 7 6   < > 12 17   CREA 0.68 0.71 0.81   < > 1.02* 1.96*   CA 8.1* 7.9* 8.3   < > 7.8* 9.2   AGAP 9 11 5   < > 10 13   AP  --   --   --   --  130 200*   TP  --   --   --   --  5.7 8.0   ALB  --   --   --   --  2.7* 3.9   GLOB  --   --   --   --  3.0 4.1*   AGRAT  --   --   --   --  0.9 1.0    < > = values in this interval not displayed. CBC w/Diff Recent Labs     01/13/19  0537 01/12/19 1939   WBC 8.1 16.4*   RBC 4.30 5.32*   HGB 12.5 15.7*   HCT 36.4 44.3    415   GRANS 82* 91*   LYMPH 11* 4*   EOS 0* 0*      Cardiac Enzymes No results for input(s): CPK, CKND1, MARIO in the last 72 hours. No lab exists for component: CKRMB, TROIP   Coagulation No results for input(s): PTP, INR, APTT in the last 72 hours. No lab exists for component: INREXT    Lipid Panel Lab Results   Component Value Date/Time    Cholesterol, total 253 (H) 11/20/2018 04:54 PM    HDL Cholesterol 44 11/20/2018 04:54 PM    LDL, calculated Comment 11/20/2018 04:54 PM    VLDL, calculated Comment 11/20/2018 04:54 PM    Triglyceride 474 (H) 11/20/2018 04:54 PM      BNP No results for input(s): BNPP in the last 72 hours.    Liver Enzymes Recent Labs     01/13/19  0537   TP 5.7   ALB 2.7*      SGOT 54*      Thyroid Studies Lab Results   Component Value Date/Time    TSH 0.357 01/13/2019 05:37 AM            Discharge Exam:  Visit Vitals  BP (!) 153/93 (BP 1 Location: Left arm, BP Patient Position: At rest;Head of bed elevated (Comment degrees)) Comment: RN Notified Navarro LAWRENCE Pulse 70   Temp 97.9 °F (36.6 °C)   Resp 18   Ht 5' 7\" (1.702 m)   Wt 79.4 kg (175 lb)   LMP 07/15/1998   SpO2 97%   BMI 27.41 kg/m²     General:          No acute distress. Head:               Atraumatic Normocephalic. Eyes:               PERRLA, EOMI, Anicteric. ENT:                No discharges/lesions. Lungs:             CTA Bilaterally. CVS:                Regular rate and rhythm,  No murmur, rub, or gallop, No JVD, No lower                 extremity edema. Abdomen:        Soft, Non distended, Non tender, Positive bowel sounds. MSK:               No deformities, lesions, Spontaneously moves extremities. Neurologic:      AOx3. No focal deficits  Psychiatry:      No anxiety/Depression  Skin:                No rash/lesions. Good skin turgor  Heme/Lymph/Immune:  No petechiae, ecchymoses, overt signs of bleeding or                              lymphadenopathy noted. Disposition: home  Discharge Condition: stable  Patient Instructions:   Current Discharge Medication List      START taking these medications    Details   diphenoxylate-atropine (LOMOTIL) 2.5-0.025 mg per tablet Take 2 Tabs by mouth every six (6) hours as needed for Diarrhea. Max Daily Amount: 8 Tabs. Qty: 30 Tab, Refills: 0    Associated Diagnoses: Acute diarrhea      Saccharomyces boulardii (FLORASTOR) 250 mg capsule Take 1 Cap by mouth two (2) times a day for 7 days. Qty: 14 Cap, Refills: 0      ciprofloxacin HCl (CIPRO) 500 mg tablet Take 1 Tab by mouth two (2) times a day for 5 days. Qty: 10 Tab, Refills: 0      metroNIDAZOLE (FLAGYL) 500 mg tablet Take 1 Tab by mouth three (3) times daily for 5 days.   Qty: 15 Tab, Refills: 0         CONTINUE these medications which have NOT CHANGED    Details   gabapentin (NEURONTIN) 100 mg capsule Take 100 mg by mouth four (4) times daily. acetaminophen-codeine (TYLENOL-CODEINE #3) 300-30 mg per tablet Take 1 Tab by mouth every four (4) hours as needed for Pain. Max Daily Amount: 6 Tabs. Qty: 20 Tab, Refills: 0    Associated Diagnoses: Trigeminal neuralgia      OXcarbaxepine (TRILEPTAL) 600 mg tablet Take 2 Tabs by mouth two (2) times a day. Qty: 60 Tab, Refills: 2    Associated Diagnoses: Trigeminal neuralgia of right side of face      omeprazole (PRILOSEC) 40 mg capsule Take 1 Cap by mouth two (2) times a day. 30 mins prior to breakfast and dinner  Qty: 180 Cap, Refills: 1    Associated Diagnoses: Gastroesophageal reflux disease without esophagitis      escitalopram oxalate (LEXAPRO) 10 mg tablet TAKE 1 TAB BY MOUTH NIGHTLY. Qty: 90 Tab, Refills: 1    Associated Diagnoses: EVI (generalized anxiety disorder)      lisinopril (PRINIVIL, ZESTRIL) 10 mg tablet Take 1 Tab by mouth daily. Qty: 90 Tab, Refills: 1    Associated Diagnoses: Essential hypertension      betamethasone valerate (VALISONE) 0.1 % topical cream Apply  to affected area two (2) times daily as needed for Skin Irritation. Do not use for more than 2 weeks; avoid face  Qty: 30 g, Refills: 0    Associated Diagnoses: Eczema, unspecified type      lidocaine (LIDOCAINE VISCOUS) 2 % solution Take 15 mL by mouth as needed for Pain (apply to inside of cheek every 3 hours as needed forpain). Qty: 1 Bottle, Refills: 0    Associated Diagnoses: Trigeminal neuralgia         STOP taking these medications       hydroCHLOROthiazide (HYDRODIURIL) 25 mg tablet Comments:   Reason for Stopping:               Activity: Activity as tolerated  Diet: Regular Diet  Wound Care: None needed    Follow-up  ·   Follow up with PCP in 1 week.   Time spent to discharge patient 35 minutes  Signed:  Diane Way MD  1/15/2019  10:35 AM

## 2019-01-15 NOTE — DISCHARGE INSTRUCTIONS
DISCHARGE SUMMARY from Nurse    PATIENT INSTRUCTIONS:    After general anesthesia or intravenous sedation, for 24 hours or while taking prescription Narcotics:  · Limit your activities  · Do not drive and operate hazardous machinery  · Do not make important personal or business decisions  · Do  not drink alcoholic beverages  · If you have not urinated within 8 hours after discharge, please contact your surgeon on call. Report the following to your surgeon:  · Excessive pain, swelling, redness or odor of or around the surgical area  · Temperature over 100.5  · Nausea and vomiting lasting longer than 4 hours or if unable to take medications  · Any signs of decreased circulation or nerve impairment to extremity: change in color, persistent  numbness, tingling, coldness or increase pain  · Any questions    What to do at Home:  Recommended activity: Activity as tolerated, GI soft diet, advance as tolerated, f/u with PCP in 1 week     If you experience any of the following symptoms worsening diarrhea, s/s of dehydration, fever, severe stomach pain, return to ER and, please follow up with PCP. *  Please give a list of your current medications to your Primary Care Provider. *  Please update this list whenever your medications are discontinued, doses are      changed, or new medications (including over-the-counter products) are added. *  Please carry medication information at all times in case of emergency situations. These are general instructions for a healthy lifestyle:    No smoking/ No tobacco products/ Avoid exposure to second hand smoke  Surgeon General's Warning:  Quitting smoking now greatly reduces serious risk to your health.     Obesity, smoking, and sedentary lifestyle greatly increases your risk for illness    A healthy diet, regular physical exercise & weight monitoring are important for maintaining a healthy lifestyle    You may be retaining fluid if you have a history of heart failure or if you experience any of the following symptoms:  Weight gain of 3 pounds or more overnight or 5 pounds in a week, increased swelling in our hands or feet or shortness of breath while lying flat in bed. Please call your doctor as soon as you notice any of these symptoms; do not wait until your next office visit. Recognize signs and symptoms of STROKE:    F-face looks uneven    A-arms unable to move or move unevenly    S-speech slurred or non-existent    T-time-call 911 as soon as signs and symptoms begin-DO NOT go       Back to bed or wait to see if you get better-TIME IS BRAIN. Warning Signs of HEART ATTACK     Call 911 if you have these symptoms:   Chest discomfort. Most heart attacks involve discomfort in the center of the chest that lasts more than a few minutes, or that goes away and comes back. It can feel like uncomfortable pressure, squeezing, fullness, or pain.  Discomfort in other areas of the upper body. Symptoms can include pain or discomfort in one or both arms, the back, neck, jaw, or stomach.  Shortness of breath with or without chest discomfort.  Other signs may include breaking out in a cold sweat, nausea, or lightheadedness. Don't wait more than five minutes to call 911 - MINUTES MATTER! Fast action can save your life. Calling 911 is almost always the fastest way to get lifesaving treatment. Emergency Medical Services staff can begin treatment when they arrive -- up to an hour sooner than if someone gets to the hospital by car. The discharge information has been reviewed with the patient. The patient verbalized understanding. Discharge medications reviewed with the patient and appropriate educational materials and side effects teaching were provided.   ___________________________________________________________________________________________________________________________________    Patient Education        Dehydration: Care Instructions  Your Care Instructions  Dehydration happens when your body loses too much fluid. This might happen when you do not drink enough water or you lose large amounts of fluids from your body because of diarrhea, vomiting, or sweating. Severe dehydration can be life-threatening. Water and minerals called electrolytes help put your body fluids back in balance. Learn the early signs of fluid loss, and drink more fluids to prevent dehydration. Follow-up care is a key part of your treatment and safety. Be sure to make and go to all appointments, and call your doctor if you are having problems. It's also a good idea to know your test results and keep a list of the medicines you take. How can you care for yourself at home? · To prevent dehydration, drink plenty of fluids, enough so that your urine is light yellow or clear like water. Choose water and other caffeine-free clear liquids until you feel better. If you have kidney, heart, or liver disease and have to limit fluids, talk with your doctor before you increase the amount of fluids you drink. · If you do not feel like eating or drinking, try taking small sips of water, sports drinks, or other rehydration drinks. · Get plenty of rest.  To prevent dehydration  · Add more fluids to your diet and daily routine, unless your doctor has told you not to. · During hot weather, drink more fluids. Drink even more fluids if you exercise a lot. Stay away from drinks with alcohol or caffeine. · Watch for the symptoms of dehydration. These include:  ? A dry, sticky mouth. ? Dark yellow urine, and not much of it. ? Dry and sunken eyes. ? Feeling very tired. · Learn what problems can lead to dehydration. These include:  ? Diarrhea, fever, and vomiting. ? Any illness with a fever, such as pneumonia or the flu. ? Activities that cause heavy sweating, such as endurance races and heavy outdoor work in hot or humid weather. ?  Alcohol or drug abuse or withdrawal.  ? Certain medicines, such as cold and allergy pills (antihistamines), diet pills (diuretics), and laxatives. ? Certain diseases, such as diabetes, cancer, and heart or kidney disease. When should you call for help? Call 911 anytime you think you may need emergency care. For example, call if:    · You passed out (lost consciousness).    Call your doctor now or seek immediate medical care if:    · You are confused and cannot think clearly.     · You are dizzy or lightheaded, or you feel like you may faint.     · You have signs of needing more fluids. You have sunken eyes and a dry mouth, and you pass only a little dark urine.     · You cannot keep fluids down.    Watch closely for changes in your health, and be sure to contact your doctor if:    · You are not making tears.     · Your skin is very dry and sags slowly back into place after you pinch it.     · Your mouth and eyes are very dry. Where can you learn more? Go to http://savanna-tye.info/. Enter L952 in the search box to learn more about \"Dehydration: Care Instructions. \"  Current as of: November 20, 2017  Content Version: 11.8  © 8700-0723 [employer]. Care instructions adapted under license by [employer] (which disclaims liability or warranty for this information). If you have questions about a medical condition or this instruction, always ask your healthcare professional. [address] any warranty or liability for your use of this information.

## 2019-01-15 NOTE — PROGRESS NOTES
Discharge instructions were reviewed with the patient. Opportunity for questions given. Patient verbalized understanding of discharge and follow up instructions, as well as S/S to report to MD or return to ER for. Prescriptions provided. Patient will D/C to home. Pt's cipro is finishing then we will remove the PIV and pt will dc home.

## 2019-01-15 NOTE — PROGRESS NOTES
Assessment complete via flow sheet. Pt &Ox3. Respirations even and unlabored, CTA. S1 S2 auscultated. Pt on room air. Pt reports pain level of 0 out of 10. Bowel sounds active, abdomen soft. Pt states diarrhea has slowed down. Denies other needs. Bed in lowest position, side rails up 3, call bell in reach. Instructed to call for assistance. Pt verbalized understanding. Plan of care reviewed with patient.

## 2019-01-15 NOTE — PROGRESS NOTES
Shift assessment complete. Patient A&O x 4. Respirations present, even and unlabored. Breath sounds clear. Patient on room air. Heart sounds regular. Tele monitor on patient and functioning properly. Bowel sounds active in all 4 quadrants. Abdomen soft and tender. Family at bedside. Patient needs met. No distress noted. Bed low and locked. Call light within reach. Will continue to monitor hourly during shift.

## 2019-01-16 LAB
BACTERIA SPEC CULT: NORMAL
E COLI SXT STL QL IA: NEGATIVE
OXCARBAZEPINE SERPL-MCNC: 63 UG/ML (ref 10–35)
SERVICE CMNT-IMP: NORMAL
SPECIMEN SOURCE: NORMAL

## 2019-01-17 LAB
BACTERIA SPEC CULT: NORMAL
BACTERIA SPEC CULT: NORMAL
SERVICE CMNT-IMP: NORMAL
SERVICE CMNT-IMP: NORMAL

## 2019-02-19 PROBLEM — N17.9 AKI (ACUTE KIDNEY INJURY) (HCC): Status: RESOLVED | Noted: 2019-01-12 | Resolved: 2019-02-19

## 2019-02-19 PROBLEM — R19.7 ACUTE DIARRHEA: Status: RESOLVED | Noted: 2019-01-12 | Resolved: 2019-02-19

## 2019-02-19 PROBLEM — N39.0 ACUTE UTI (URINARY TRACT INFECTION): Status: RESOLVED | Noted: 2019-01-12 | Resolved: 2019-02-19

## 2019-10-08 NOTE — PROGRESS NOTES
[address] January 15, 2019       RE: Hiral Rios      To Whom It May Concern,    This is to certify that Hiral Rios has been under our care from 1/12-1/15/2019. She may return to work on 1/21/2019. Berry Ken Please feel free to contact my office if you have any questions or concerns. Thank you for your assistance in this matter.       Sincerely,  Kai Simmons RN (4) no impairment

## 2020-07-07 ENCOUNTER — HOSPITAL ENCOUNTER (OUTPATIENT)
Dept: MAMMOGRAPHY | Age: 50
Discharge: HOME | End: 2020-07-07
Attending: FAMILY MEDICINE

## 2020-07-07 DIAGNOSIS — Z12.31 VISIT FOR SCREENING MAMMOGRAM: ICD-10-CM

## 2022-02-11 PROBLEM — M54.42 LOW BACK PAIN DUE TO BILATERAL SCIATICA: Status: ACTIVE | Noted: 2022-02-11

## 2022-02-11 PROBLEM — M54.41 LOW BACK PAIN DUE TO BILATERAL SCIATICA: Status: ACTIVE | Noted: 2022-02-11

## 2022-02-11 PROBLEM — R20.2 PARESTHESIA OF LOWER EXTREMITY: Status: ACTIVE | Noted: 2022-02-11

## 2022-02-11 PROBLEM — R20.8 DYSESTHESIA: Status: ACTIVE | Noted: 2022-02-11

## 2022-02-22 ENCOUNTER — HOSPITAL ENCOUNTER (OUTPATIENT)
Dept: MRI IMAGING | Age: 52
Discharge: HOME | End: 2022-02-22
Attending: PSYCHIATRY & NEUROLOGY
Payer: COMMERCIAL

## 2022-02-22 DIAGNOSIS — M54.41 LOW BACK PAIN DUE TO BILATERAL SCIATICA: ICD-10-CM

## 2022-02-22 DIAGNOSIS — R20.2 PARESTHESIA OF LOWER EXTREMITY: ICD-10-CM

## 2022-02-22 DIAGNOSIS — M54.42 LOW BACK PAIN DUE TO BILATERAL SCIATICA: ICD-10-CM

## 2022-02-22 PROCEDURE — 72148 MRI LUMBAR SPINE W/O DYE: CPT

## 2022-02-24 NOTE — PROGRESS NOTES
The MRI spine lumbar reveals abnormlities that are known and seen via orthopedics etc... Recommend[de-identified]    See your orthopedic specialist about back arthritis progression.         Demetrius Stalney MD  Consultative Neurology, Neurodiagnostics and Neurotherapeutics  Neuroelectrophysiology, EEG, EMG  [facility]  [address], [address] [facility]  Phone:  [phone]  Fax:   [phone]

## 2022-03-18 PROBLEM — E87.20 LACTIC ACIDOSIS: Status: ACTIVE | Noted: 2019-01-12

## 2022-03-19 PROBLEM — M54.41 LOW BACK PAIN DUE TO BILATERAL SCIATICA: Status: ACTIVE | Noted: 2022-02-11

## 2022-03-19 PROBLEM — E87.1 ACUTE HYPONATREMIA: Status: ACTIVE | Noted: 2019-01-12

## 2022-03-19 PROBLEM — T50.905D MEDICATION SIDE EFFECT, SUBSEQUENT ENCOUNTER: Status: ACTIVE | Noted: 2018-11-07

## 2022-03-19 PROBLEM — I95.9 HYPOTENSION: Status: ACTIVE | Noted: 2019-01-12

## 2022-03-19 PROBLEM — H57.02 PHYSIOLOGIC ANISOCORIA: Status: ACTIVE | Noted: 2018-11-07

## 2022-03-19 PROBLEM — G50.0 TRIGEMINAL NEURALGIA OF RIGHT SIDE OF FACE: Status: ACTIVE | Noted: 2018-11-07

## 2022-03-19 PROBLEM — G43.009 MIGRAINE WITHOUT AURA AND WITHOUT STATUS MIGRAINOSUS, NOT INTRACTABLE: Status: ACTIVE | Noted: 2018-11-07

## 2022-03-19 PROBLEM — R20.8 DYSESTHESIA: Status: ACTIVE | Noted: 2022-02-11

## 2022-03-19 PROBLEM — D72.829 LEUKOCYTOSIS: Status: ACTIVE | Noted: 2019-01-12

## 2022-03-19 PROBLEM — R20.2 PARESTHESIA OF LOWER EXTREMITY: Status: ACTIVE | Noted: 2022-02-11

## 2022-03-19 PROBLEM — M54.42 LOW BACK PAIN DUE TO BILATERAL SCIATICA: Status: ACTIVE | Noted: 2022-02-11

## 2022-06-21 ENCOUNTER — TELEPHONE (OUTPATIENT)
Dept: ORTHOPEDIC SURGERY | Age: 52
End: 2022-06-21

## 2022-06-21 DIAGNOSIS — M47.816 LUMBAR SPONDYLOSIS: Primary | ICD-10-CM

## 2022-06-21 DIAGNOSIS — M54.17 LUMBOSACRAL RADICULOPATHY: ICD-10-CM

## 2022-06-21 NOTE — LETTER
Lumbar Spine Injection Precert Authorization Form    Name: Mazin Ernandez  : 1970  Age: 46 y.o. Gender: female    Clinical Information    Referring Doctor: Sabino Camarillo MD  Diagnosis:     ICD-10-CM    1. Lumbar spondylosis  M47.816    2. Lumbosacral radiculopathy  M54.17    . Body Part: lumbar spine    Type of Service    [x ] 06275+- ALONSO Caudal or Lumbar    [x ] 58906- Facet Lumbar (single Level)x2    [ ] 72037- Facet 2nd Level    [ ] 84232- Facet 3 or more level    [ ] 41196- Sacroiliac joint     [ ] 61976- SNRB Transforaminal ALONSO Lumbar or Sacral (single level)    [ ] 76670- SNRB add levels Lumbar or Sacral     [ ] 13020- Sciatic Nerve, single.      [ ] 05269- Radiofrequency L/S single facet     [ ] 96812- Radiofrequency L/s additional facet       Comments  ALONSO and Bilateral L5-1 Facets    Insurance:    [address] [place] [id number]  Phone: [phone]  Fax: [phone]    Ro Dolan MD    2022

## 2022-06-29 ENCOUNTER — OFFICE VISIT (OUTPATIENT)
Dept: ORTHOPEDIC SURGERY | Age: 52
End: 2022-06-29
Payer: COMMERCIAL

## 2022-06-29 DIAGNOSIS — M47.816 SPONDYLOSIS OF LUMBAR REGION WITHOUT MYELOPATHY OR RADICULOPATHY: Primary | ICD-10-CM

## 2022-06-29 PROCEDURE — 64493 INJ PARAVERT F JNT L/S 1 LEV: CPT | Performed by: PHYSICAL MEDICINE & REHABILITATION

## 2022-06-29 PROCEDURE — 64494 INJ PARAVERT F JNT L/S 2 LEV: CPT | Performed by: PHYSICAL MEDICINE & REHABILITATION

## 2022-06-29 RX ORDER — TRIAMCINOLONE ACETONIDE 40 MG/ML
200 INJECTION, SUSPENSION INTRA-ARTICULAR; INTRAMUSCULAR ONCE
Status: COMPLETED | OUTPATIENT
Start: 2022-06-29 | End: 2022-06-29

## 2022-06-29 RX ADMIN — TRIAMCINOLONE ACETONIDE 200 MG: 40 INJECTION, SUSPENSION INTRA-ARTICULAR; INTRAMUSCULAR at 16:41

## 2022-06-29 NOTE — PROGRESS NOTES
Date: 06/29/22   Name: Esthela Siddiqui    Pre-Procedural Diagnosis:    Diagnosis Orders   1. Spondylosis of lumbar region without myelopathy or radiculopathy       She had some response to a prior left L5 selective nerve root block. The left leg is still giving her some difficulties but the worst is pain in the lower lumbar paraspinal areas. This could be seen with facet joint arthropathy and I would consider performing facet injections today. She does not necessarily feel today we need to repeat a nerve root block. Procedure: Bilateral Facet Joint Injections (2 levels)    Precautions:  [facility] Precautions spine injections: None. Patient denies any prior sensitivity to steroid, local anesthetic, contrast dye, iodine or shellfish. The procedure was discussed at length with the patient and informed consent was signed. The patient was placed in a prone position on the fluoroscopy table and the skin was prepped and draped in a routine sterile fashion. The areas to be injected were each anesthetized with approximately 2-3 cc of 1% Lidocaine. Initially a 22-gauge 3.5 inch inch spinal needle was carefully advanced under fluoroscopic guidance to the bilateral L4-L5 and L5-S1 facet joint spaces. 1 cc of 0.25% Marcaine and 50 mg of Kenalog were injected through the spinal needle at each site. Fluoroscopic guidance was used intermittently over a 10-minute period to insure proper needle placement and patient safety. A hard copy of the fluoroscopic  images has been placed in the patient's chart. The patient was monitored after the procedure and discharged home without complication.      Resume Meds:     N/A    Mau Kay MD  06/29/22 No new care gaps identified.  [facility] Embedded Care Gaps. Reference number: [id number]. 6/13/2022   2:06:52 PM CDT

## 2022-08-09 ENCOUNTER — TELEPHONE (OUTPATIENT)
Dept: ORTHOPEDIC SURGERY | Age: 52
End: 2022-08-09

## 2022-08-09 NOTE — TELEPHONE ENCOUNTER
Returning your call from yesterday, she sent a my chart message about leg pain after her inj and waht other options she had.

## 2022-08-10 ENCOUNTER — TELEPHONE (OUTPATIENT)
Dept: ORTHOPEDIC SURGERY | Age: 52
End: 2022-08-10

## 2022-08-10 NOTE — TELEPHONE ENCOUNTER
Patient cancelled spine inject this afternoon, the  bringing her is sick and has no way to apt, wants to reschedule

## 2022-08-17 ENCOUNTER — OFFICE VISIT (OUTPATIENT)
Dept: ORTHOPEDIC SURGERY | Age: 52
End: 2022-08-17
Payer: COMMERCIAL

## 2022-08-17 DIAGNOSIS — M54.17 RADICULOPATHY, LUMBOSACRAL REGION: Primary | ICD-10-CM

## 2022-08-17 PROCEDURE — 64483 NJX AA&/STRD TFRM EPI L/S 1: CPT | Performed by: PHYSICAL MEDICINE & REHABILITATION

## 2022-08-17 PROCEDURE — 64484 NJX AA&/STRD TFRM EPI L/S EA: CPT | Performed by: PHYSICAL MEDICINE & REHABILITATION

## 2022-08-17 RX ORDER — TRIAMCINOLONE ACETONIDE 40 MG/ML
160 INJECTION, SUSPENSION INTRA-ARTICULAR; INTRAMUSCULAR ONCE
Status: COMPLETED | OUTPATIENT
Start: 2022-08-17 | End: 2022-08-17

## 2022-08-17 RX ADMIN — TRIAMCINOLONE ACETONIDE 160 MG: 40 INJECTION, SUSPENSION INTRA-ARTICULAR; INTRAMUSCULAR at 15:02

## 2022-08-17 NOTE — PROGRESS NOTES
Date: 08/17/22   Name: Jong Alvarez    Pre-Procedural Diagnosis:    Diagnosis Orders   1. Radiculopathy, lumbosacral region  FL NERVE BLOCK LUMBOSACRAL 1ST    FL NERVE BLOCK LUMBOSACRAL EACH ADD      She has had favorable responses to left L5 selective nerve root blocks as well as lower lumbar facet injections. The latter were performed 6 weeks ago-doing well. The pain in the left leg has returned, more lateral but there is also an anterior component. I would advocate selective nerve root blocks at the left L4 and L5 level see if we can broaden the coverage a little bit get a longer lasting benefit. She does have some neural foraminal narrowing on the left at the L5-S1 level that could explain some of the symptoms. Procedure: Selective Nerve Root Blocks (Transforaminal) - Multiple Level    Precautions:  LB Precautions spine injections: None. Patient denies any prior sensitivity to steroid, local anesthetic, contrast dye, iodine or shellfish. The procedure was discussed at length with the patient and informed consent was signed. The patient was placed in a prone position on the fluoroscopy table and the skin was prepped and draped in a routine sterile fashion. The areas to be injected were each anesthetized with approximately 5 cc of 1% Lidocaine. A 22-gauge 3.5 inch inch spinal needle was carefully advanced under fluoroscopic guidance to the left L4 transforaminal space and subsequently the left L5 transforaminal space. At this time 0.25 cc of omnipaque administered. . Once proper placement was confirmed, 2 cc of 0.25% Marcaine and 80 mg of Kenalog were injected through the spinal needle at each site. Fluoroscopic guidance was used intermittently over a 10-minute period to insure proper needle placement and patient safety. A hard copy of the fluoroscopic  images has been placed in the patient's chart. The patient was monitored after the procedure and discharged home in stable fashion. Resume Meds:  N/A    Chip Kaur MD  08/17/22